# Patient Record
Sex: FEMALE | Race: WHITE | Employment: OTHER | URBAN - METROPOLITAN AREA
[De-identification: names, ages, dates, MRNs, and addresses within clinical notes are randomized per-mention and may not be internally consistent; named-entity substitution may affect disease eponyms.]

---

## 2017-02-28 ENCOUNTER — APPOINTMENT (OUTPATIENT)
Dept: GENERAL RADIOLOGY | Age: 82
End: 2017-02-28
Attending: EMERGENCY MEDICINE
Payer: MEDICARE

## 2017-02-28 ENCOUNTER — APPOINTMENT (OUTPATIENT)
Dept: CT IMAGING | Age: 82
End: 2017-02-28
Attending: EMERGENCY MEDICINE
Payer: MEDICARE

## 2017-02-28 ENCOUNTER — HOSPITAL ENCOUNTER (EMERGENCY)
Age: 82
Discharge: NURSING, REHAB OR LONG-TERM CARE | End: 2017-02-28
Attending: EMERGENCY MEDICINE
Payer: MEDICARE

## 2017-02-28 VITALS
HEART RATE: 80 BPM | WEIGHT: 230 LBS | SYSTOLIC BLOOD PRESSURE: 103 MMHG | TEMPERATURE: 98.3 F | BODY MASS INDEX: 45.16 KG/M2 | RESPIRATION RATE: 19 BRPM | HEIGHT: 60 IN | DIASTOLIC BLOOD PRESSURE: 66 MMHG | OXYGEN SATURATION: 98 %

## 2017-02-28 DIAGNOSIS — M25.571 ACUTE RIGHT ANKLE PAIN: ICD-10-CM

## 2017-02-28 DIAGNOSIS — S00.83XA FOREHEAD CONTUSION, INITIAL ENCOUNTER: ICD-10-CM

## 2017-02-28 DIAGNOSIS — S92.414A CLOSED NONDISPLACED FRACTURE OF PROXIMAL PHALANX OF RIGHT GREAT TOE, INITIAL ENCOUNTER: ICD-10-CM
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
## 2017-02-28 DIAGNOSIS — S09.90XA MINOR HEAD INJURY, INITIAL ENCOUNTER: ICD-10-CM

## 2017-02-28 PROCEDURE — 74011250637 HC RX REV CODE- 250/637: Performed by: EMERGENCY MEDICINE

## 2017-02-28 PROCEDURE — 70450 CT HEAD/BRAIN W/O DYE: CPT

## 2017-02-28 PROCEDURE — 71010 XR CHEST PORT: CPT

## 2017-02-28 PROCEDURE — 73630 X-RAY EXAM OF FOOT: CPT

## 2017-02-28 PROCEDURE — 99285 EMERGENCY DEPT VISIT HI MDM: CPT

## 2017-02-28 PROCEDURE — 72125 CT NECK SPINE W/O DYE: CPT

## 2017-02-28 PROCEDURE — 73610 X-RAY EXAM OF ANKLE: CPT

## 2017-02-28 RX ORDER — NITROFURANTOIN 25; 75 MG/1; MG/1
100 CAPSULE ORAL 2 TIMES DAILY
COMMUNITY
End: 2018-02-11

## 2017-02-28 RX ORDER — CYCLOSPORINE 0.5 MG/ML
1 EMULSION OPHTHALMIC 2 TIMES DAILY
COMMUNITY

## 2017-02-28 RX ORDER — ACETAMINOPHEN 325 MG/1
650 TABLET ORAL
Status: COMPLETED | OUTPATIENT
Start: 2017-02-28 | End: 2017-02-28

## 2017-02-28 RX ADMIN — ACETAMINOPHEN 650 MG: 325 TABLET ORAL at 21:59

## 2017-02-28 NOTE — ED PROVIDER NOTES
HPI Comments: 80 y.o. female with extensive past medical history, please see list, significant for HTN, DM, COPD, OA, sleep apnea, neuropathy, GERD, narcolepsy, and sepsis who presents to the ED via EMS for evaluation following a fall. Pt reports she was being pushed in her wheelchair this evening when her right foot got caught underneath and she \"flipped forward\" and fell face first. Denies LOC. Pt now reports swelling and hematoma to her forehead accompanied by right foot and ankle pain secondary to the fall. Pt states she also has a productive cough x 4 weeks and chest congestion. Pt states she takes aspirin but denies taking any other anticoagulants. Pt denies fever, neck pain, or back pain. There are no other acute medical complaints voiced at this time. Social Hx: Former smoker. Denies use of EtOH or drugs. PCP: Justin Waters DO    Note written by Carlos Jacobson, as dictated by Esperanza Salmon MD 6:55 PM     The history is provided by the patient and a relative.         Past Medical History:   Diagnosis Date    Anxiety     Bladder neoplasm     Cataracts, bilateral     Chronic pain     R shoulder arthritis    COPD, mild (HCC)     Coronary artery occlusion (HCC)     Dermatitis     Diabetes ([address]     DM (diabetes mellitus) (HCC)     GERD (gastroesophageal reflux disease)     Hyperlipidemia     Hypertension     Narcolepsy     Narcolepsy     Neuropathy     Osteoarthritis     Osteoarthrosis     Other screening mammogram 08/31/2015    BIRADS 1:  Negative  repeat 1 year    Pain in joint, shoulder region     Pruritic disorder     Reflux esophagitis     Sepsis ([address]     Sleep apnea        Past Surgical History:   Procedure Laterality Date    HX APPENDECTOMY      HX CYSTECTOMY  1963    breast right    HX LAP CHOLECYSTECTOMY           Family History:   Problem Relation Age of Onset    Diabetes Mother     Hypertension Mother        Social History     Social History    Marital status:      Spouse name: N/A    Number of children: N/A    Years of education: N/A     Occupational History    Not on file. Social History Main Topics    Smoking status: Former Smoker    Smokeless tobacco: Never Used    Alcohol use No    Drug use: No    Sexual activity: Not Currently     Birth control/ protection: None     Other Topics Concern    Not on file     Social History Narrative         ALLERGIES: Mercury (bulk)    Review of Systems   Constitutional: Negative for fever. HENT: Positive for congestion. Respiratory: Positive for cough. Musculoskeletal: Negative for back pain and neck pain. +right foot and ankle pain   Skin:        +swelling and hematoma to forehead   All other systems reviewed and are negative. Vitals:    02/28/17 1842   BP: 159/68   Pulse: 73   Resp: 17   Temp: 98.3 °F (36.8 °C)   SpO2: 93%   Weight: 104.3 kg (230 lb)   Height: 5' (1.524 m)            Physical Exam   Constitutional: She is oriented to person, place, and time. She appears well-developed and well-nourished. HENT:   Head: Macrocephalic. Mouth/Throat: Oropharynx is clear and moist. No oropharyngeal exudate. Eyes: Conjunctivae are normal. Pupils are equal, round, and reactive to light. Right eye exhibits no discharge. Left eye exhibits no discharge. No scleral icterus. Neck: Normal range of motion. Neck supple. Cardiovascular: Normal rate, regular rhythm and normal heart sounds. Exam reveals no gallop and no friction rub. No murmur heard. Pulmonary/Chest: Effort normal and breath sounds normal. No respiratory distress. She has no wheezes. She has no rales. Abdominal: Soft. Bowel sounds are normal. She exhibits no distension. There is no tenderness. There is no guarding. Musculoskeletal: Normal range of motion. She exhibits tenderness (right ankle maleoli and right great toe). She exhibits no edema. Lymphadenopathy:     She has no cervical adenopathy. Neurological: She is alert and oriented to person, place, and time. No cranial nerve deficit. Coordination normal.   Skin: Skin is warm and dry. No rash noted. No pallor. Nursing note and vitals reviewed. MDM  Number of Diagnoses or Management Options  Diagnosis management comments: Frontal hematoma s/p fall. Tender right foot and ankle. Cough x 1 month. DDX:  ICH, skull fx, scalp hematoma, neck injury, ankle and foot injury and others. Check cxr, right ankle and foot xr, ct head and c-spine. Ice to forehead. ED Course       Procedures    9:24 PM  JONNY TAPE GREAT TOE.  F/U ORHTOPEDICS. NON WEIGHT BEARING RIGHT FOOT. Patient's results have been reviewed with them. Patient and/or family have verbally conveyed their understanding and agreement of the patient's signs, symptoms, diagnosis, treatment and prognosis and additionally agree to follow up as recommended or return to the Emergency Room should their condition change prior to follow-up. Discharge instructions have also been provided to the patient with some educational information regarding their diagnosis as well a list of reasons why they would want to return to the ER prior to their follow-up appointment should their condition change. No results found for this or any previous visit (from the past 24 hour(s)). Xr Ankle Rt Min 3 V    Result Date: 2/28/2017  EXAM:  XR ANKLE RT MIN 3 V INDICATION:  Right ankle pain s/p fall. COMPARISON: None. FINDINGS: Three views of the right ankle demonstrate no fracture or disruption of the ankle mortise. There is diffuse osteopenia with mild DJD. [facility] The soft tissues are within normal limits. IMPRESSION: No acute abnormality. Xr Foot Rt Min 3 V    Result Date: 2/28/2017  EXAM:  XR FOOT RT MIN 3 V INDICATION:   Right foot pain post fall. COMPARISON:  None. FINDINGS:  Three views of the right foot demonstrate diffuse osteopenia.  There is a sagittally oriented intra-articular fracture base proximal phalanx of the great toe. .  The soft tissues are within normal limits. IMPRESSION:  1. Acute fracture proximal phalanx great toe. Ct Head Wo Cont    Result Date: 2/28/2017  Indication:  fall, head trauma, large hematoma Comparison: CT 11/23/2015 Findings: 5 mm axial images were obtained from the skull base through the vertex. CT dose reduction was achieved through the use of a standardized protocol tailored for this examination and automatic exposure control for dose modulation. The ventricles and cortical sulci are prominent, compatible with age related volume loss. There is no evidence of intracranial hemorrhage, mass, mass effect, or acute infarct. There is periventricular white matter disease. No extra-axial fluid collections are seen. The visualized paranasal sinuses and mastoid air cells are clear. The orbital structures are unremarkable. No osseous abnormalities are seen. Impression: 1. No evidence of acute infarct or intracranial hemorrhage. 2. Mild periventricular white matter disease is likely secondary to chronic small vessel ischemic changes. 3. Frontal scalp hematoma. Ct Spine Cerv Wo Cont    Result Date: 2/28/2017  INDICATION:   fall with head trauma and neck injury COMPARISON: None. TECHNIQUE:   Noncontrast axial CT imaging of the cervical spine was performed. Coronal and sagittal reconstructions were obtained. CT dose reduction was achieved through the use of a standardized protocol tailored for this examination and automatic exposure control for dose modulation. FINDINGS: There is no evidence of acute osseous abnormality. There is no acute alignment abnormality. There is anterolisthesis at C3-C4 measuring 5 mm. Vertebral body heights are maintained. There is no appreciable prevertebral soft tissue swelling or epidural hematoma. There is multilevel degenerative spondylosis. Evaluation of the paraspinal soft tissues demonstrates no significant pathology.  The visualized lung apices are clear. IMPRESSION: 1. No acute osseous abnormality. 2. Multilevel degenerative spondylosis. 3. Anterolisthesis at C3-C4. Xr Chest Port    Result Date: 2/28/2017  INDICATION:  cough x 1 month Exam: Portable chest 2030. Comparison September 27, 2015. Findings: Cardiomediastinal silhouette is within normal limits. Pulmonary vasculature is not engorged. There are no focal parenchymal opacities, effusions, or pneumothorax. Diffuse interstitial prominence unchanged with minimal atelectasis left chest     Impression: 1.  No acute disease

## 2017-02-28 NOTE — ED TRIAGE NOTES
Patient arrived via EMS  Patient fell out of wheelchair at 1730. Patient fell face first, presents with hematoma to forehead.

## 2017-03-01 NOTE — DISCHARGE INSTRUCTIONS
Head Injury: Care Instructions  Your Care Instructions  Most injuries to the head are minor. Bumps, cuts, and scrapes on the head and face usually heal well and can be treated the same as injuries to other parts of the body. Although it's rare, once in a while a more serious problem shows up after you are home. So it's good to be on the lookout for symptoms for a day or two. Follow-up care is a key part of your treatment and safety. Be sure to make and go to all appointments, and call your doctor if you are having problems. It's also a good idea to know your test results and keep a list of the medicines you take. How can you care for yourself at home? · Follow your doctor's instructions. He or she will tell you if you need someone to watch you closely for the next 24 hours or longer. · Take it easy for the next few days or more if you are not feeling well. · Ask your doctor when it's okay for you to go back to activities like driving a car, riding a bike, or operating machinery. When should you call for help? Call 911 anytime you think you may need emergency care. For example, call if:  · You have a seizure. · You passed out (lost consciousness). · You are confused or can't stay awake. Call your doctor now or seek immediate medical care if:  · You have new or worse vomiting. · You feel less alert. · You have new weakness or numbness in any part of your body. Watch closely for changes in your health, and be sure to contact your doctor if:  · You do not get better as expected. · You have new symptoms, such as headaches, trouble concentrating, or changes in mood. Where can you learn more? Go to http://lena-jacques.info/. Enter P589 in the search box to learn more about \"Head Injury: Care Instructions. \"  Current as of: September 7, 2016  Content Version: 11.1  © 1740-5207 Healthwise, Incorporated.  Care instructions adapted under license by [employer] (which disclaims liability or warranty for this information). If you have questions about a medical condition or this instruction, always ask your healthcare professional. [address] any warranty or liability for your use of this information. Broken Toe: Care Instructions  Your Care Instructions  You have broken (fractured) a bone in your toe. This kind of fracture does not need a special cast or brace. \"Yoandy-taping\" your broken toe to a healthy toe next to it is almost always enough to treat the problem and ease symptoms. The toe may take 4 weeks or more to heal.  You heal best when you take good care of yourself. Eat a variety of healthy foods, and don't smoke. Follow-up care is a key part of your treatment and safety. Be sure to make and go to all appointments, and call your doctor if you are having problems. It's also a good idea to know your test results and keep a list of the medicines you take. How can you care for yourself at home? · Be safe with medicines. Take pain medicines exactly as directed. ¨ If the doctor gave you a prescription medicine for pain, take it as prescribed. ¨ If you are not taking a prescription pain medicine, ask your doctor if you can take an over-the-counter medicine. · If your toe is taped to the toe next to it, your doctor has shown you how to change the tape. Protect the skin by putting something soft, such as felt or foam, between your toes before you tape them together. Never tape the toes together skin-to-skin. Your broken toe may need to be yoandy-taped for 2 to 4 weeks to heal.  · Rest and protect your toe. Do not walk on it until you can do so without too much pain. If the doctor has told you to use crutches, use them as instructed. · Put ice or a cold pack on your toe for 10 to 20 minutes at a time. Try to do this every 1 to 2 hours for the next 3 days (when you are awake) or until the swelling goes down.  Put a thin cloth between the ice and your skin.  · Prop up your foot on a pillow when you ice it or anytime you sit or lie down. Try to keep it above the level of your heart. This will help reduce swelling. · Make sure you go to your follow-up appointments. Your doctor will need to check that your toe is healing right. When should you call for help? Call your doctor now or seek immediate medical care if:  · You have severe pain. · Your toe is cool or pale or changes color. · You have tingling, weakness, or numbness in your toe. Watch closely for changes in your health, and be sure to contact your doctor if:  · Pain and swelling get worse or are not improving. · You have a new or worse deformity in your toe. Where can you learn more? Go to http://lena-jacques.info/. Enter Q560 in the search box to learn more about \"Broken Toe: Care Instructions. \"  Current as of: May 23, 2016  Content Version: 11.1  © 3531-5292 [employer]. Care instructions adapted under license by [employer] (which disclaims liability or warranty for this information). If you have questions about a medical condition or this instruction, always ask your healthcare professional. [place] [address] any warranty or liability for your use of this information. Bruises: Care Instructions  Your Care Instructions    Bruises occur when small blood vessels under the skin tear or rupture, most often from a twist, bump, or fall. Blood leaks into tissues under the skin and causes a black-and-blue spot that often turns colors, including purplish black, reddish blue, or yellowish green, as the bruise heals. Bruises hurt, but most are not serious and will go away on their own within 2 to 4 weeks. Sometimes, gravity causes them to spread down the body. A leg bruise usually will take longer to heal than a bruise on the face or arms. Follow-up care is a key part of your treatment and safety.  Be sure to make and go to all appointments, and call your doctor if you are having problems. Its also a good idea to know your test results and keep a list of the medicines you take. How can you care for yourself at home? · Take pain medicines exactly as directed. ¨ If the doctor gave you a prescription medicine for pain, take it as prescribed. ¨ If you are not taking a prescription pain medicine, ask your doctor if you can take an over-the-counter medicine. · Put ice or a cold pack on the area for 10 to 20 minutes at a time. Put a thin cloth between the ice and your skin. · If you can, prop up the bruised area on pillows as much as possible for the next few days. Try to keep the bruise above the level of your heart. When should you call for help? Call your doctor now or seek immediate medical care if:  · You have signs of infection, such as:  ¨ Increased pain, swelling, warmth, or redness. ¨ Red streaks leading from the bruise. ¨ Pus draining from the bruise. ¨ A fever. · You have a bruise on your leg and signs of a blood clot, such as:  ¨ Increasing redness and swelling along with warmth, tenderness, and pain in the bruised area. ¨ Pain in your calf, back of the knee, thigh, or groin. ¨ Redness and swelling in your leg or groin. · Your pain gets worse. Watch closely for changes in your health, and be sure to contact your doctor if:  · You do not get better as expected. Where can you learn more? Go to http://elna-jacques.info/. Enter [phone] in the search box to learn more about \"Bruises: Care Instructions. \"  Current as of: May 27, 2016  Content Version: 11.1  © 1204-6954 Healthwise, Incorporated. Care instructions adapted under license by [employer] (which disclaims liability or warranty for this information).  If you have questions about a medical condition or this instruction, always ask your healthcare professional. [address] any warranty or liability for your use of this information. We hope that we have addressed all of your medical concerns. The examination and treatment you received in the Emergency Department were for an emergent problem and were not intended as complete care. It is important that you follow up with your healthcare provider(s) for ongoing care. If your symptoms worsen or do not improve as expected, and you are unable to reach your usual health care provider(s), you should return to the Emergency Department. Today's healthcare is undergoing tremendous change, and patient satisfaction surveys are one of the many tools to assess the quality of medical care. You may receive a survey from the [employer] (MM) regarding your experience in the Emergency Department. I hope that your experience has been completely positive, particularly the medical care that I provided. As such, please participate in the survey; anything less than excellent does not meet my expectations or intentions. [facility] and 8 [facility] participate in nationally recognized quality of care measures. If your blood pressure is greater than 120/80, as reported below, we urge that you seek medical care to address the potential of high blood pressure, commonly known as hypertension. Hypertension can be hereditary or can be caused by certain medical conditions, pain, stress, or \"white coat syndrome. \"       Please make an appointment with your health care provider(s) for follow up of your Emergency Department visit.        VITALS:   Patient Vitals for the past 8 hrs:   Temp Pulse Resp BP SpO2   02/28/17 2030 - 75 16 103/66 100 %   02/28/17 2015 - 84 21 (!) 147/131 93 %   02/28/17 2000 - 73 15 (!) 156/97 98 %   02/28/17 1945 - 76 25 168/55 99 %   02/28/17 1915 - 78 17 (!) 179/106 92 %   02/28/17 1900 - 73 17 167/69 90 %   02/28/17 1845 - 75 19 163/76 97 %   02/28/17 1842 98.3 °F (36.8 °C) 73 17 159/68 93 %          Thank you for allowing us to provide you with medical care today. We realize that you have many choices for your emergency care needs. Please choose us in the future for any continued health care needs. [facility] [facility], [address]: [phone]            No results found for this or any previous visit (from the past 24 hour(s)). Xr Ankle Rt Min 3 V    Result Date: 2/28/2017  EXAM:  XR ANKLE RT MIN 3 V INDICATION:  Right ankle pain s/p fall. COMPARISON: None. FINDINGS: Three views of the right ankle demonstrate no fracture or disruption of the ankle mortise. There is diffuse osteopenia with mild DJD. [facility] The soft tissues are within normal limits. IMPRESSION: No acute abnormality. Xr Foot Rt Min 3 V    Result Date: 2/28/2017  EXAM:  XR FOOT RT MIN 3 V INDICATION:   Right foot pain post fall. COMPARISON:  None. FINDINGS:  Three views of the right foot demonstrate diffuse osteopenia. There is a sagittally oriented intra-articular fracture base proximal phalanx of the great toe. .  The soft tissues are within normal limits. IMPRESSION:  1. Acute fracture proximal phalanx great toe. Ct Head Wo Cont    Result Date: 2/28/2017  Indication:  fall, head trauma, large hematoma Comparison: CT 11/23/2015 Findings: 5 mm axial images were obtained from the skull base through the vertex. CT dose reduction was achieved through the use of a standardized protocol tailored for this examination and automatic exposure control for dose modulation. The ventricles and cortical sulci are prominent, compatible with age related volume loss. There is no evidence of intracranial hemorrhage, mass, mass effect, or acute infarct. There is periventricular white matter disease. No extra-axial fluid collections are seen. The visualized paranasal sinuses and mastoid air cells are clear. The orbital structures are unremarkable. No osseous abnormalities are seen. Impression: 1. No evidence of acute infarct or intracranial hemorrhage. 2. Mild periventricular white matter disease is likely secondary to chronic small vessel ischemic changes. 3. Frontal scalp hematoma. Ct Spine Cerv Wo Cont    Result Date: 2/28/2017  INDICATION:   fall with head trauma and neck injury COMPARISON: None. TECHNIQUE:   Noncontrast axial CT imaging of the cervical spine was performed. Coronal and sagittal reconstructions were obtained. CT dose reduction was achieved through the use of a standardized protocol tailored for this examination and automatic exposure control for dose modulation. FINDINGS: There is no evidence of acute osseous abnormality. There is no acute alignment abnormality. There is anterolisthesis at C3-C4 measuring 5 mm. Vertebral body heights are maintained. There is no appreciable prevertebral soft tissue swelling or epidural hematoma. There is multilevel degenerative spondylosis. Evaluation of the paraspinal soft tissues demonstrates no significant pathology. The visualized lung apices are clear. IMPRESSION: 1. No acute osseous abnormality. 2. Multilevel degenerative spondylosis. 3. Anterolisthesis at C3-C4. Xr Chest Port    Result Date: 2/28/2017  INDICATION:  cough x 1 month Exam: Portable chest 2030. Comparison September 27, 2015. Findings: Cardiomediastinal silhouette is within normal limits. Pulmonary vasculature is not engorged. There are no focal parenchymal opacities, effusions, or pneumothorax. Diffuse interstitial prominence unchanged with minimal atelectasis left chest     Impression: 1.  No acute disease

## 2017-03-01 NOTE — ROUTINE PROCESS
TRANSFER - OUT REPORT:    Verbal report given to Mirta Sterling RN(name) on Areli Wilson  being transferred to [facility] for routine progression of care       Report consisted of patients Situation, Background, Assessment and   Recommendations(SBAR). Information from the following report(s) SBAR, ED Summary and Recent Results was reviewed with the receiving nurse. Lines:       Opportunity for questions and clarification was provided.       Patient transported with:  EMS

## 2017-09-30 ENCOUNTER — HOSPITAL ENCOUNTER (EMERGENCY)
Age: 82
Discharge: HOME | End: 2017-10-01
Attending: STUDENT IN AN ORGANIZED HEALTH CARE EDUCATION/TRAINING PROGRAM
Payer: MEDICARE

## 2017-09-30 DIAGNOSIS — J02.9 SORE THROAT: Primary | ICD-10-CM

## 2017-09-30 LAB — DEPRECATED S PYO AG THROAT QL EIA: NEGATIVE

## 2017-09-30 PROCEDURE — 87070 CULTURE OTHR SPECIMN AEROBIC: CPT | Performed by: PHYSICIAN ASSISTANT

## 2017-09-30 PROCEDURE — 74011250637 HC RX REV CODE- 250/637: Performed by: PHYSICIAN ASSISTANT

## 2017-09-30 PROCEDURE — 99284 EMERGENCY DEPT VISIT MOD MDM: CPT

## 2017-09-30 PROCEDURE — 87880 STREP A ASSAY W/OPTIC: CPT | Performed by: PHYSICIAN ASSISTANT

## 2017-09-30 RX ORDER — ACETAMINOPHEN 325 MG/1
650 TABLET ORAL
Status: COMPLETED | OUTPATIENT
Start: 2017-09-30 | End: 2017-09-30

## 2017-09-30 RX ADMIN — ACETAMINOPHEN 650 MG: 325 TABLET ORAL at 22:00

## 2017-10-01 VITALS
BODY MASS INDEX: 47.12 KG/M2 | TEMPERATURE: 99.6 F | HEIGHT: 60 IN | WEIGHT: 240 LBS | RESPIRATION RATE: 16 BRPM | OXYGEN SATURATION: 95 % | SYSTOLIC BLOOD PRESSURE: 110 MMHG | DIASTOLIC BLOOD PRESSURE: 85 MMHG | HEART RATE: 93 BPM

## 2017-10-01 PROCEDURE — 74011000250 HC RX REV CODE- 250: Performed by: PHYSICIAN ASSISTANT

## 2017-10-01 RX ADMIN — BENZOCAINE: 200 SPRAY DENTAL; ORAL; PERIODONTAL at 00:00

## 2017-10-01 NOTE — ED NOTES
Discharge instructions given by provider. Patient verbalized an understanding, has no further questions.      [facility] called with patients ETA

## 2017-10-01 NOTE — ED PROVIDER NOTES
HPI Comments: Kear Andrade is a 80 y.o. female  who presents by EMS to ER with c/o Patient presents with:  Sore Throat. Patient came from [facility]. Per nursing home notes patient with sore throat since this morning and temp of 100. Patients symptoms were not resolved with guaifenesin so patient was brought to ED. Patient denies any other complaints. She specifically denies any  chills, nausea, vomiting, chest pain, shortness of breath, headache, rash, diarrhea, abdominal pain, urinary/bowel changes, sweating or weight loss. PCP: Anjali Hansen DO   PMHx significant for: Past Medical History:  No date: Anxiety  No date: Bladder neoplasm  No date: Cataracts, bilateral  No date: Chronic pain      Comment: R shoulder arthritis  No date: COPD, mild (HCC)  No date: Coronary artery occlusion (HCC)  No date: Dermatitis  No date: Diabetes (HCC)  No date: DM (diabetes mellitus) ([facility])  No date: GERD (gastroesophageal reflux disease)  No date: Hyperlipidemia  No date: Hypertension  No date: Narcolepsy  No date: Narcolepsy  No date: Neuropathy ([facility]  No date: Osteoarthritis  No date: Osteoarthrosis  08/31/2015: Other screening mammogram      Comment: BIRADS 1:  Negative  repeat 1 year  No date: Pain in joint, shoulder region  No date: Pruritic disorder  No date: Reflux esophagitis  No date: Sepsis ([facility] [address]  No date: Sleep apnea   PSHx significant for: Past Surgical History:  No date: HX APPENDECTOMY  1963: HX CYSTECTOMY      Comment: breast right  No date: HX LAP CHOLECYSTECTOMY  Social Hx: Tobacco use: Smoking status: Former Smoker                                                              Packs/day: 0.00      Years: 0.00      Smokeless status: Never Used                      ; EtOH use: The patient states she drinks 0 per week.; Illicit Drug use: Allergies:   -- Mercury (Bulk) -- Unknown (comments)    There are no other complaints, changes or physical findings at this time. Patient is a 80 y.o. female presenting with sore throat. The history is provided by the patient. Sore Throat    This is a new problem. The current episode started 12 to 24 hours ago. The problem has not changed since onset. There has been no fever. Pertinent negatives include no diarrhea, no vomiting, no congestion, no drooling, no ear discharge, no ear pain, no headaches, no plugged ear sensation, no shortness of breath, no stridor, no swollen glands, no trouble swallowing, no stiff neck and no cough. She has had no exposure to strep or mono. She has tried nothing for the symptoms. Past Medical History:   Diagnosis Date    Anxiety     Bladder neoplasm     Cataracts, bilateral     Chronic pain     R shoulder arthritis    COPD, mild (HCC)     Coronary artery occlusion (HCC)     Dermatitis     Diabetes ([address]     DM (diabetes mellitus) (HCC)     GERD (gastroesophageal reflux disease)     Hyperlipidemia     Hypertension     Narcolepsy     Narcolepsy     Neuropathy (HCC)     Osteoarthritis     Osteoarthrosis     Other screening mammogram 08/31/2015    BIRADS 1:  Negative  repeat 1 year    Pain in joint, shoulder region     Pruritic disorder     Reflux esophagitis     Sepsis ([address]     Sleep apnea        Past Surgical History:   Procedure Laterality Date    HX APPENDECTOMY      HX CYSTECTOMY  1963    breast right    HX LAP CHOLECYSTECTOMY           Family History:   Problem Relation Age of Onset    Diabetes Mother     Hypertension Mother        Social History     Social History    Marital status:      Spouse name: N/A    Number of children: N/A    Years of education: N/A     Occupational History    Not on file.      Social History Main Topics    Smoking status: Former Smoker    Smokeless tobacco: Never Used    Alcohol use No    Drug use: No    Sexual activity: Not Currently     Birth control/ protection: None     Other Topics Concern    Not on file     Social History Narrative ALLERGIES: Mercury (bulk)    Review of Systems   Constitutional: Negative. HENT: Positive for sore throat. Negative for congestion, drooling, ear discharge, ear pain and trouble swallowing. Eyes: Negative. Respiratory: Negative. Negative for cough, shortness of breath and stridor. Cardiovascular: Negative. Gastrointestinal: Negative. Negative for diarrhea and vomiting. Endocrine: Negative. Genitourinary: Negative. Musculoskeletal: Negative. Skin: Negative. Allergic/Immunologic: Negative. Neurological: Negative. Negative for headaches. Hematological: Negative. Psychiatric/Behavioral: Negative. All other systems reviewed and are negative. Vitals:    09/30/17 2129   BP: 105/86   Pulse: 93   Resp: 16   Temp: 99.6 °F (37.6 °C)   SpO2: 94%   Weight: 108.9 kg (240 lb)   Height: 5' (1.524 m)            Physical Exam   Constitutional: She is oriented to person, place, and time. Vital signs are normal. She appears well-developed and well-nourished. Non-toxic appearance. She does not have a sickly appearance. She does not appear ill. No distress. HENT:   Head: Normocephalic and atraumatic. Right Ear: Tympanic membrane, external ear and ear canal normal.   Left Ear: Tympanic membrane, external ear and ear canal normal.   Mouth/Throat: Uvula is midline. Mucous membranes are not pale and dry. Posterior oropharyngeal erythema present. No oropharyngeal exudate or posterior oropharyngeal edema. Eyes: Conjunctivae and EOM are normal. Pupils are equal, round, and reactive to light. Right eye exhibits no discharge. Left eye exhibits no discharge. No scleral icterus. Neck: Normal range of motion. No tracheal deviation present. No thyromegaly present. Cardiovascular: Normal rate, regular rhythm and normal heart sounds. No murmur heard. Pulmonary/Chest: Effort normal and breath sounds normal. No accessory muscle usage. No respiratory distress.  She has no decreased breath sounds. She has no wheezes. She has no rales. She exhibits no tenderness. Abdominal: Soft. Normal appearance and bowel sounds are normal. She exhibits no distension. There is no tenderness. There is no rebound and no guarding. Musculoskeletal: Normal range of motion. She exhibits no edema or tenderness. Lymphadenopathy:     She has no cervical adenopathy. Neurological: She is alert and oriented to person, place, and time. No cranial nerve deficit. Coordination normal.   Skin: Skin is warm. No erythema. Psychiatric: She has a normal mood and affect. Her behavior is normal. Judgment and thought content normal.   Nursing note and vitals reviewed. MDM  Number of Diagnoses or Management Options  Sore throat:   Diagnosis management comments: Assesment/Plan- 80 y.o. Patient presents with:  Sore Throat  differential includes: strep, pharyngitis, uri. Labs reviewed with negative poc strep test. Will discharge with phenol spray. Recommend PCP follow up. Patient educated on reasons to return to the ED.          Amount and/or Complexity of Data Reviewed  Clinical lab tests: ordered and reviewed      ED Course       Procedures

## 2017-10-01 NOTE — DISCHARGE INSTRUCTIONS
Sore Throat: Care Instructions  Your Care Instructions    Infection by bacteria or a virus causes most sore throats. Cigarette smoke, dry air, air pollution, allergies, and yelling can also cause a sore throat. Sore throats can be painful and annoying. Fortunately, most sore throats go away on their own. If you have a bacterial infection, your doctor may prescribe antibiotics. Follow-up care is a key part of your treatment and safety. Be sure to make and go to all appointments, and call your doctor if you are having problems. It's also a good idea to know your test results and keep a list of the medicines you take. How can you care for yourself at home? · If your doctor prescribed antibiotics, take them as directed. Do not stop taking them just because you feel better. You need to take the full course of antibiotics. · Gargle with warm salt water once an hour to help reduce swelling and relieve discomfort. Use 1 teaspoon of salt mixed in 1 cup of warm water. · Take an over-the-counter pain medicine, such as acetaminophen (Tylenol), ibuprofen (Advil, Motrin), or naproxen (Aleve). Read and follow all instructions on the label. · Be careful when taking over-the-counter cold or flu medicines and Tylenol at the same time. Many of these medicines have acetaminophen, which is Tylenol. Read the labels to make sure that you are not taking more than the recommended dose. Too much acetaminophen (Tylenol) can be harmful. · Drink plenty of fluids. Fluids may help soothe an irritated throat. Hot fluids, such as tea or soup, may help decrease throat pain. · Use over-the-counter throat lozenges to soothe pain. Regular cough drops or hard candy may also help. These should not be given to young children because of the risk of choking. · Do not smoke or allow others to smoke around you. If you need help quitting, talk to your doctor about stop-smoking programs and medicines.  These can increase your chances of quitting for good. · Use a vaporizer or humidifier to add moisture to your bedroom. Follow the directions for cleaning the machine. When should you call for help? Call your doctor now or seek immediate medical care if:  · You have new or worse trouble swallowing. · Your sore throat gets much worse on one side. Watch closely for changes in your health, and be sure to contact your doctor if you do not get better as expected. Where can you learn more? Go to http://lena-jacques.info/. Enter [phone] in the search box to learn more about \"Sore Throat: Care Instructions. \"  Current as of: July 29, 2016  Content Version: 11.3  © 6924-3277 [employer]. Care instructions adapted under license by [employer] (which disclaims liability or warranty for this information). If you have questions about a medical condition or this instruction, always ask your healthcare professional. [address] any warranty or liability for your use of this information.

## 2017-10-03 LAB
BACTERIA SPEC CULT: NORMAL
SERVICE CMNT-IMP: NORMAL

## 2018-02-11 ENCOUNTER — APPOINTMENT (OUTPATIENT)
Dept: CT IMAGING | Age: 83
DRG: 690 | End: 2018-02-11
Attending: UROLOGY
Payer: MEDICARE

## 2018-02-11 ENCOUNTER — HOSPITAL ENCOUNTER (INPATIENT)
Age: 83
LOS: 2 days | Discharge: NURSING, REHAB OR LONG-TERM CARE | DRG: 690 | End: 2018-02-13
Attending: EMERGENCY MEDICINE | Admitting: INTERNAL MEDICINE
Payer: MEDICARE

## 2018-02-11 ENCOUNTER — APPOINTMENT (OUTPATIENT)
Dept: GENERAL RADIOLOGY | Age: 83
DRG: 690 | End: 2018-02-11
Attending: EMERGENCY MEDICINE
Payer: MEDICARE

## 2018-02-11 DIAGNOSIS — N39.0 URINARY TRACT INFECTION WITH HEMATURIA, SITE UNSPECIFIED: ICD-10-CM

## 2018-02-11 DIAGNOSIS — R31.9 HEMATURIA, UNSPECIFIED TYPE: Primary | ICD-10-CM

## 2018-02-11 DIAGNOSIS — R00.0 TACHYCARDIA: ICD-10-CM

## 2018-02-11 DIAGNOSIS — R31.9 URINARY TRACT INFECTION WITH HEMATURIA, SITE UNSPECIFIED: ICD-10-CM

## 2018-02-11 LAB
ABO + RH BLD: NORMAL
ALBUMIN SERPL-MCNC: 2.6 G/DL (ref 3.5–5)
ALBUMIN/GLOB SERPL: 0.5 {RATIO} (ref 1.1–2.2)
ALP SERPL-CCNC: 148 U/L (ref 45–117)
ALT SERPL-CCNC: 30 U/L (ref 12–78)
ANION GAP SERPL CALC-SCNC: 7 MMOL/L (ref 5–15)
APPEARANCE UR: ABNORMAL
AST SERPL-CCNC: 51 U/L (ref 15–37)
BACTERIA URNS QL MICRO: ABNORMAL /HPF
BASOPHILS # BLD: 0.1 K/UL (ref 0–0.1)
BASOPHILS NFR BLD: 1 % (ref 0–1)
BILIRUB SERPL-MCNC: 0.6 MG/DL (ref 0.2–1)
BILIRUB UR QL: NEGATIVE
BLOOD GROUP ANTIBODIES SERPL: NORMAL
BUN SERPL-MCNC: 13 MG/DL (ref 6–20)
BUN/CREAT SERPL: 13 (ref 12–20)
CALCIUM SERPL-MCNC: 8.9 MG/DL (ref 8.5–10.1)
CHLORIDE SERPL-SCNC: 104 MMOL/L (ref 97–108)
CO2 SERPL-SCNC: 27 MMOL/L (ref 21–32)
COLOR UR: ABNORMAL
CREAT SERPL-MCNC: 1 MG/DL (ref 0.55–1.02)
DIFFERENTIAL METHOD BLD: NORMAL
EOSINOPHIL # BLD: 0.4 K/UL (ref 0–0.4)
EOSINOPHIL NFR BLD: 5 % (ref 0–7)
EPITH CASTS URNS QL MICRO: ABNORMAL /LPF
ERYTHROCYTE [DISTWIDTH] IN BLOOD BY AUTOMATED COUNT: 14.4 % (ref 11.5–14.5)
GLOBULIN SER CALC-MCNC: 5.2 G/DL (ref 2–4)
GLUCOSE BLD STRIP.AUTO-MCNC: 135 MG/DL (ref 65–100)
GLUCOSE BLD STRIP.AUTO-MCNC: 147 MG/DL (ref 65–100)
GLUCOSE BLD STRIP.AUTO-MCNC: 215 MG/DL (ref 65–100)
GLUCOSE SERPL-MCNC: 172 MG/DL (ref 65–100)
GLUCOSE UR STRIP.AUTO-MCNC: NEGATIVE MG/DL
HCT VFR BLD AUTO: 39.7 % (ref 35–47)
HEMOCCULT STL QL: NEGATIVE
HGB BLD-MCNC: 12.9 G/DL (ref 11.5–16)
HGB UR QL STRIP: ABNORMAL
IMM GRANULOCYTES # BLD: 0 K/UL (ref 0–0.04)
IMM GRANULOCYTES NFR BLD AUTO: 0 % (ref 0–0.5)
KETONES UR QL STRIP.AUTO: NEGATIVE MG/DL
LACTATE SERPL-SCNC: 1.7 MMOL/L (ref 0.4–2)
LEUKOCYTE ESTERASE UR QL STRIP.AUTO: ABNORMAL
LYMPHOCYTES # BLD: 2 K/UL (ref 0.8–3.5)
LYMPHOCYTES NFR BLD: 26 % (ref 12–49)
MAGNESIUM SERPL-MCNC: 1.7 MG/DL (ref 1.6–2.4)
MCH RBC QN AUTO: 30.5 PG (ref 26–34)
MCHC RBC AUTO-ENTMCNC: 32.5 G/DL (ref 30–36.5)
MCV RBC AUTO: 93.9 FL (ref 80–99)
MONOCYTES # BLD: 0.8 K/UL (ref 0–1)
MONOCYTES NFR BLD: 10 % (ref 5–13)
NEUTS SEG # BLD: 4.5 K/UL (ref 1.8–8)
NEUTS SEG NFR BLD: 58 % (ref 32–75)
NITRITE UR QL STRIP.AUTO: NEGATIVE
NRBC # BLD: 0 K/UL (ref 0–0.01)
NRBC BLD-RTO: 0 PER 100 WBC
PH UR STRIP: 6 [PH] (ref 5–8)
PLATELET # BLD AUTO: 183 K/UL (ref 150–400)
PMV BLD AUTO: 9.9 FL (ref 8.9–12.9)
POTASSIUM SERPL-SCNC: 4.2 MMOL/L (ref 3.5–5.1)
PROT SERPL-MCNC: 7.8 G/DL (ref 6.4–8.2)
PROT UR STRIP-MCNC: 100 MG/DL
RBC # BLD AUTO: 4.23 M/UL (ref 3.8–5.2)
RBC #/AREA URNS HPF: >100 /HPF (ref 0–5)
SERVICE CMNT-IMP: ABNORMAL
SODIUM SERPL-SCNC: 138 MMOL/L (ref 136–145)
SP GR UR REFRACTOMETRY: 1.01 (ref 1–1.03)
SPECIMEN EXP DATE BLD: NORMAL
TROPONIN I SERPL-MCNC: <0.04 NG/ML
UA: UC IF INDICATED,UAUC: ABNORMAL
UROBILINOGEN UR QL STRIP.AUTO: 0.2 EU/DL (ref 0.2–1)
WBC # BLD AUTO: 7.8 K/UL (ref 3.6–11)
WBC URNS QL MICRO: >100 /HPF (ref 0–4)

## 2018-02-11 PROCEDURE — 74011000258 HC RX REV CODE- 258: Performed by: EMERGENCY MEDICINE

## 2018-02-11 PROCEDURE — 82962 GLUCOSE BLOOD TEST: CPT

## 2018-02-11 PROCEDURE — 99285 EMERGENCY DEPT VISIT HI MDM: CPT

## 2018-02-11 PROCEDURE — 71045 X-RAY EXAM CHEST 1 VIEW: CPT

## 2018-02-11 PROCEDURE — 86900 BLOOD TYPING SEROLOGIC ABO: CPT | Performed by: EMERGENCY MEDICINE

## 2018-02-11 PROCEDURE — 83735 ASSAY OF MAGNESIUM: CPT | Performed by: INTERNAL MEDICINE

## 2018-02-11 PROCEDURE — 87086 URINE CULTURE/COLONY COUNT: CPT | Performed by: EMERGENCY MEDICINE

## 2018-02-11 PROCEDURE — 74011000250 HC RX REV CODE- 250: Performed by: INTERNAL MEDICINE

## 2018-02-11 PROCEDURE — 93005 ELECTROCARDIOGRAM TRACING: CPT

## 2018-02-11 PROCEDURE — 84484 ASSAY OF TROPONIN QUANT: CPT | Performed by: EMERGENCY MEDICINE

## 2018-02-11 PROCEDURE — 87077 CULTURE AEROBIC IDENTIFY: CPT | Performed by: EMERGENCY MEDICINE

## 2018-02-11 PROCEDURE — 65660000000 HC RM CCU STEPDOWN

## 2018-02-11 PROCEDURE — 82272 OCCULT BLD FECES 1-3 TESTS: CPT | Performed by: EMERGENCY MEDICINE

## 2018-02-11 PROCEDURE — 74011250637 HC RX REV CODE- 250/637: Performed by: INTERNAL MEDICINE

## 2018-02-11 PROCEDURE — 74011250636 HC RX REV CODE- 250/636: Performed by: EMERGENCY MEDICINE

## 2018-02-11 PROCEDURE — 36415 COLL VENOUS BLD VENIPUNCTURE: CPT | Performed by: EMERGENCY MEDICINE

## 2018-02-11 PROCEDURE — 87186 SC STD MICRODIL/AGAR DIL: CPT | Performed by: EMERGENCY MEDICINE

## 2018-02-11 PROCEDURE — 74011636637 HC RX REV CODE- 636/637: Performed by: INTERNAL MEDICINE

## 2018-02-11 PROCEDURE — 83605 ASSAY OF LACTIC ACID: CPT | Performed by: INTERNAL MEDICINE

## 2018-02-11 PROCEDURE — 74011250636 HC RX REV CODE- 250/636: Performed by: INTERNAL MEDICINE

## 2018-02-11 PROCEDURE — 51798 US URINE CAPACITY MEASURE: CPT

## 2018-02-11 PROCEDURE — 85025 COMPLETE CBC W/AUTO DIFF WBC: CPT | Performed by: EMERGENCY MEDICINE

## 2018-02-11 PROCEDURE — 74176 CT ABD & PELVIS W/O CONTRAST: CPT

## 2018-02-11 PROCEDURE — 80053 COMPREHEN METABOLIC PANEL: CPT | Performed by: EMERGENCY MEDICINE

## 2018-02-11 PROCEDURE — 81001 URINALYSIS AUTO W/SCOPE: CPT | Performed by: EMERGENCY MEDICINE

## 2018-02-11 RX ORDER — TRIAMCINOLONE ACETONIDE 1 MG/G
CREAM TOPICAL 2 TIMES DAILY
COMMUNITY

## 2018-02-11 RX ORDER — CALCIUM CARBONATE 200(500)MG
200 TABLET,CHEWABLE ORAL 2 TIMES DAILY
Status: DISCONTINUED | OUTPATIENT
Start: 2018-02-11 | End: 2018-02-13 | Stop reason: HOSPADM

## 2018-02-11 RX ORDER — LORATADINE 10 MG/1
10 TABLET ORAL DAILY
COMMUNITY

## 2018-02-11 RX ORDER — SODIUM CHLORIDE 0.9 % (FLUSH) 0.9 %
5-10 SYRINGE (ML) INJECTION AS NEEDED
Status: DISCONTINUED | OUTPATIENT
Start: 2018-02-11 | End: 2018-02-13 | Stop reason: HOSPADM

## 2018-02-11 RX ORDER — MICONAZOLE NITRATE 2 %
POWDER (GRAM) TOPICAL 2 TIMES DAILY
COMMUNITY

## 2018-02-11 RX ORDER — FACIAL-BODY WIPES
10 EACH TOPICAL DAILY
COMMUNITY
End: 2018-02-11

## 2018-02-11 RX ORDER — INSULIN ASPART 100 [IU]/ML
INJECTION, SOLUTION INTRAVENOUS; SUBCUTANEOUS
COMMUNITY

## 2018-02-11 RX ORDER — METOPROLOL SUCCINATE 25 MG/1
12.5 TABLET, EXTENDED RELEASE ORAL DAILY
Status: DISCONTINUED | OUTPATIENT
Start: 2018-02-11 | End: 2018-02-12

## 2018-02-11 RX ORDER — FAMOTIDINE 20 MG/1
20 TABLET, FILM COATED ORAL
COMMUNITY

## 2018-02-11 RX ORDER — INSULIN GLARGINE 100 [IU]/ML
15 INJECTION, SOLUTION SUBCUTANEOUS DAILY
Status: DISCONTINUED | OUTPATIENT
Start: 2018-02-12 | End: 2018-02-13 | Stop reason: HOSPADM

## 2018-02-11 RX ORDER — SODIUM CHLORIDE 0.9 % (FLUSH) 0.9 %
5-10 SYRINGE (ML) INJECTION EVERY 8 HOURS
Status: DISCONTINUED | OUTPATIENT
Start: 2018-02-11 | End: 2018-02-13 | Stop reason: HOSPADM

## 2018-02-11 RX ORDER — DICLOFENAC SODIUM 10 MG/G
2 GEL TOPICAL 3 TIMES DAILY
Status: DISCONTINUED | OUTPATIENT
Start: 2018-02-11 | End: 2018-02-13 | Stop reason: HOSPADM

## 2018-02-11 RX ORDER — SODIUM CHLORIDE 9 MG/ML
75 INJECTION, SOLUTION INTRAVENOUS CONTINUOUS
Status: DISCONTINUED | OUTPATIENT
Start: 2018-02-11 | End: 2018-02-13 | Stop reason: HOSPADM

## 2018-02-11 RX ORDER — OSELTAMIVIR PHOSPHATE 30 MG/1
30 CAPSULE ORAL DAILY
COMMUNITY
Start: 2018-02-09 | End: 2018-02-13

## 2018-02-11 RX ORDER — MAGNESIUM SULFATE 100 %
4 CRYSTALS MISCELLANEOUS AS NEEDED
Status: DISCONTINUED | OUTPATIENT
Start: 2018-02-11 | End: 2018-02-13 | Stop reason: HOSPADM

## 2018-02-11 RX ORDER — FAMOTIDINE 20 MG/1
20 TABLET, FILM COATED ORAL
Status: DISCONTINUED | OUTPATIENT
Start: 2018-02-11 | End: 2018-02-13 | Stop reason: HOSPADM

## 2018-02-11 RX ORDER — DEXTROSE 50 % IN WATER (D50W) INTRAVENOUS SYRINGE
12.5-25 AS NEEDED
Status: DISCONTINUED | OUTPATIENT
Start: 2018-02-11 | End: 2018-02-13 | Stop reason: HOSPADM

## 2018-02-11 RX ORDER — ALENDRONATE SODIUM 70 MG/1
70 TABLET ORAL
COMMUNITY

## 2018-02-11 RX ORDER — CYCLOSPORINE 0.5 MG/ML
1 EMULSION OPHTHALMIC 2 TIMES DAILY
Status: DISCONTINUED | OUTPATIENT
Start: 2018-02-11 | End: 2018-02-13 | Stop reason: HOSPADM

## 2018-02-11 RX ORDER — GLUCOSAMINE SULFATE 1500 MG
1000 POWDER IN PACKET (EA) ORAL DAILY
COMMUNITY

## 2018-02-11 RX ORDER — NYSTATIN 100000 U/G
CREAM TOPICAL 2 TIMES DAILY
COMMUNITY
End: 2018-02-13

## 2018-02-11 RX ORDER — POLYETHYLENE GLYCOL 3350 17 G/17G
17 POWDER, FOR SOLUTION ORAL
Status: DISCONTINUED | OUTPATIENT
Start: 2018-02-11 | End: 2018-02-13 | Stop reason: HOSPADM

## 2018-02-11 RX ORDER — INSULIN ASPART 100 [IU]/ML
4 INJECTION, SOLUTION INTRAVENOUS; SUBCUTANEOUS
COMMUNITY

## 2018-02-11 RX ORDER — KETOCONAZOLE 20 MG/ML
SHAMPOO TOPICAL
COMMUNITY

## 2018-02-11 RX ORDER — NALOXONE HYDROCHLORIDE 0.4 MG/ML
0.4 INJECTION, SOLUTION INTRAMUSCULAR; INTRAVENOUS; SUBCUTANEOUS AS NEEDED
Status: DISCONTINUED | OUTPATIENT
Start: 2018-02-11 | End: 2018-02-13 | Stop reason: HOSPADM

## 2018-02-11 RX ORDER — DICLOFENAC SODIUM 10 MG/G
2 GEL TOPICAL 3 TIMES DAILY
COMMUNITY

## 2018-02-11 RX ORDER — ASCORBIC ACID 500 MG
500 TABLET ORAL 2 TIMES DAILY
Status: DISCONTINUED | OUTPATIENT
Start: 2018-02-11 | End: 2018-02-13 | Stop reason: HOSPADM

## 2018-02-11 RX ORDER — HYDROCODONE BITARTRATE AND ACETAMINOPHEN 5; 325 MG/1; MG/1
1 TABLET ORAL
COMMUNITY

## 2018-02-11 RX ORDER — LOPERAMIDE HCL 2 MG
2 TABLET ORAL
COMMUNITY
End: 2018-02-11

## 2018-02-11 RX ORDER — MELATONIN
1000 DAILY
Status: DISCONTINUED | OUTPATIENT
Start: 2018-02-12 | End: 2018-02-13 | Stop reason: HOSPADM

## 2018-02-11 RX ORDER — OXYBUTYNIN CHLORIDE 10 MG/1
10 TABLET, EXTENDED RELEASE ORAL DAILY
COMMUNITY
End: 2018-02-13

## 2018-02-11 RX ORDER — THERA TABS 400 MCG
1 TAB ORAL DAILY
Status: DISCONTINUED | OUTPATIENT
Start: 2018-02-12 | End: 2018-02-13 | Stop reason: HOSPADM

## 2018-02-11 RX ORDER — FLUCONAZOLE 150 MG/1
150 TABLET ORAL DAILY
COMMUNITY
End: 2018-02-11

## 2018-02-11 RX ORDER — INSULIN LISPRO 100 [IU]/ML
INJECTION, SOLUTION INTRAVENOUS; SUBCUTANEOUS
Status: DISCONTINUED | OUTPATIENT
Start: 2018-02-11 | End: 2018-02-13 | Stop reason: HOSPADM

## 2018-02-11 RX ORDER — HYDROCODONE BITARTRATE AND ACETAMINOPHEN 5; 325 MG/1; MG/1
1 TABLET ORAL
Status: DISCONTINUED | OUTPATIENT
Start: 2018-02-11 | End: 2018-02-13 | Stop reason: HOSPADM

## 2018-02-11 RX ORDER — LORATADINE 10 MG/1
10 TABLET ORAL DAILY
Status: DISCONTINUED | OUTPATIENT
Start: 2018-02-12 | End: 2018-02-13 | Stop reason: HOSPADM

## 2018-02-11 RX ORDER — NYSTATIN 100000 [USP'U]/G
POWDER TOPICAL 3 TIMES DAILY
Status: DISCONTINUED | OUTPATIENT
Start: 2018-02-11 | End: 2018-02-13 | Stop reason: HOSPADM

## 2018-02-11 RX ADMIN — SODIUM CHLORIDE 100 ML/HR: 900 INJECTION, SOLUTION INTRAVENOUS at 10:16

## 2018-02-11 RX ADMIN — SODIUM CHLORIDE 1 G: 900 INJECTION, SOLUTION INTRAVENOUS at 10:16

## 2018-02-11 RX ADMIN — DICLOFENAC 2 G: 10 GEL TOPICAL at 21:32

## 2018-02-11 RX ADMIN — NYSTATIN: 100000 POWDER TOPICAL at 17:06

## 2018-02-11 RX ADMIN — SODIUM CHLORIDE 1000 ML: 900 INJECTION, SOLUTION INTRAVENOUS at 08:48

## 2018-02-11 RX ADMIN — INSULIN LISPRO 2 UNITS: 100 INJECTION, SOLUTION INTRAVENOUS; SUBCUTANEOUS at 21:31

## 2018-02-11 RX ADMIN — INSULIN LISPRO 2 UNITS: 100 INJECTION, SOLUTION INTRAVENOUS; SUBCUTANEOUS at 11:48

## 2018-02-11 RX ADMIN — FAMOTIDINE 20 MG: 20 TABLET, FILM COATED ORAL at 21:31

## 2018-02-11 RX ADMIN — Medication 10 ML: at 13:27

## 2018-02-11 RX ADMIN — NYSTATIN: 100000 POWDER TOPICAL at 21:33

## 2018-02-11 RX ADMIN — CYCLOSPORINE 1 DROP: 0.5 EMULSION OPHTHALMIC at 21:32

## 2018-02-11 RX ADMIN — Medication 10 ML: at 21:33

## 2018-02-11 RX ADMIN — DICLOFENAC 2 G: 10 GEL TOPICAL at 21:36

## 2018-02-11 RX ADMIN — MINERAL OIL, PETROLATUM: 425; 573 OINTMENT OPHTHALMIC at 21:32

## 2018-02-11 RX ADMIN — ANTACID TABLETS 200 MG: 500 TABLET, CHEWABLE ORAL at 17:57

## 2018-02-11 RX ADMIN — SODIUM CHLORIDE 75 ML/HR: 900 INJECTION, SOLUTION INTRAVENOUS at 19:24

## 2018-02-11 RX ADMIN — METOPROLOL SUCCINATE 12.5 MG: 25 TABLET, EXTENDED RELEASE ORAL at 17:57

## 2018-02-11 RX ADMIN — OXYCODONE HYDROCHLORIDE AND ACETAMINOPHEN 500 MG: 500 TABLET ORAL at 17:57

## 2018-02-11 NOTE — ED TRIAGE NOTES
Pt arrives via ems from [facility] with c/o bloody stool this morning x1. Pt with no complaints.

## 2018-02-11 NOTE — IP AVS SNAPSHOT
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 
[phone]  
 follow up with primary care physician In 1 week Ke Ibarra MD In 3 days for bloody urine, with history of bladder cancer [address] [facility] [address] [address] 
[phone] Jude Walton MD   Patient can only remember the practice name and not the physician Discharge Orders None A check lena indicates which time of day the medication should be taken. My Medications START taking these medications Instructions Each Dose to Equal  
 Morning Noon Evening Bedtime  
 cefdinir 300 mg capsule Commonly known as:  OMNICEF Your last dose was: To begin after discharge. Take 1 Cap by mouth two (2) times a day for 7 days. 300 mg  
    
   
   
   
  
 nystatin powder Commonly known as:  MYCOSTATIN Replaces:  nystatin topical cream  
Your last dose was: Last dose given on 2/13 at 9:15 a.m. Apply  to affected area three (3) times daily. CONTINUE taking these medications Instructions Each Dose to Equal  
 Morning Noon Evening Bedtime  
 ascorbic acid (vitamin C) 500 mg tablet Commonly known as:  VITAMIN C Your last dose was: Last dose given on 2/13 at 9:15 a.m. Take 1 Tab by mouth two (2) times a day. 500 mg  
    
   
   
   
  
 calcium carbonate 200 mg calcium (500 mg) Chew Commonly known as:  TUMS Your last dose was: Last dose given on 2/13 at 9:15 a.m. Take 1 Tab by mouth two (2) times a day. 1 Tab  
    
   
   
   
  
 famotidine 20 mg tablet Commonly known as:  PEPCID Your last dose was: Last dose given 2/12 at 9:30 p.m. Take 20 mg by mouth nightly. 20 mg  
    
   
   
   
  
 FOSAMAX 70 mg tablet Generic drug:  alendronate Your last dose was:  Not given in the hospital.  
   
 Take 70 mg by mouth every Wednesday. 70 mg  
    
   
   
   
  
 ketoconazole 2 % shampoo Commonly known as:  NIZORAL Your last dose was:  Not given in the hospital.  
   
 Apply  to affected area two (2) days a week. On Monday and Thursday LEVEMIR 100 unit/mL injection Generic drug:  insulin detemir Your last dose was:  Not given in the hospital.  
   
 20 Units by SubCUTAneous route daily. 20 Units  
    
   
   
   
  
 loratadine 10 mg tablet Commonly known as:  Michaeline Haven Your last dose was: Last dose given on 2/13 at 9:15 a.m. Take 10 mg by mouth daily. 10 mg MIRALAX 17 gram/dose powder Generic drug:  polyethylene glycol Your last dose was:  Not given in the hospital.  
   
 Take 17 g by mouth nightly. Mix in 8 oz of water or juice. Hold if loose stools develop 17 g NORCO 5-325 mg per tablet Generic drug:  HYDROcodone-acetaminophen Your last dose was:  Not given in the hospital.  
   
 Take 1 Tab by mouth two (2) times daily as needed for Pain. 1 Tab * NovoLOG 100 unit/mL injection Generic drug:  insulin aspart Your last dose was:  Given per sliding scale instructions. by SubCUTAneous route Before breakfast, lunch, and dinner. Sliding scale 160 to 199 give 2 units 200 to 249 give 3 units 250 to 299 give 5 units 300 to 349 give 7 units 350 to 399 give 9 units Greater than 400 call MD  
     
   
   
   
  
 * NovoLOG 100 unit/mL injection Generic drug:  insulin aspart Your last dose was:  Given per sliding scale instructions. by SubCUTAneous route nightly. Sliding scale 200 to 249 give 2 units 250 to 299 give 3 units 300 to 349 give 4 units 350 to 399 give 5 units Greater than 400 call MD  
     
   
   
   
  
 * NovoLOG 100 unit/mL injection Generic drug:  insulin aspart  
   
 4 Units by SubCUTAneous route Before breakfast, lunch, and dinner. 4 Units OTHER Your last dose was:  Not given in the hospital.  
   
 Cranberry 450 mg twice daily  
     
   
   
   
  
 phenol 0.5 % Spra Commonly known as:  Estefania Sun Your last dose was:  Not given in the hospital.  
   
 1 Spray by Mucous Membrane route four (4) times daily as needed. 1 [place] REFRESH P.M. 57.3-42.5 % Oint ophthalmic ointment Generic drug:  White Petrolatum-Mineral Oil Your last dose was: Last dose given 2/12 at 9:30 p.m. Administer  to both eyes nightly. RESTASIS 0.05 % ophthalmic emulsion Generic drug:  cycloSPORINE Your last dose was: Last dose given on 2/13 at 9:15 a.m. Administer 1 Drop to both eyes two (2) times a day. 1 Drop  
    
   
   
   
  
 therapeutic multivitamin tablet Commonly known as:  [facility] Your last dose was: Last dose given on 2/13 at 9:15 a.m. Take 1 Tab by mouth daily. 1 Tab  
    
   
   
   
  
 TOPROL XL 25 mg XL tablet Generic drug:  metoprolol succinate Your last dose was: Last dose given on 2/13 at 9:15 a.m. Take 12.5 mg by mouth daily. Hold for sbp less than or equal to 100  
 12.5 mg  
    
   
   
   
  
 triamcinolone acetonide 0.1 % topical cream  
Commonly known as:  KENALOG Your last dose was: Last dose given on 2/13 at 9:15 a.m. Apply  to affected area two (2) times a day. use thin layer  Under breast and to abdominal folds VITAMIN D3 1,000 unit Cap Generic drug:  cholecalciferol Your last dose was: Last dose given on 2/13 at 9:15 a.m. Take 1,000 Units by mouth daily. 1000 Units VOLTAREN 1 % Gel Generic drug:  diclofenac Your last dose was: Last dose given on 2/13 at 9:15 a.m. Apply 2 g to affected area three (3) times daily. Apply to bilateral shoulders 2 g ZEASORB AF 2 % topical powder Generic drug:  miconazole Your last dose was:  Not given in the hospital.  
   
 Apply  to affected area two (2) times a day. Apply over the top of nystatin and triamcinolone cream under breast and abdominal folds * Notice: This list has 3 medication(s) that are the same as other medications prescribed for you. Read the directions carefully, and ask your doctor or other care provider to review them with you. STOP taking these medications   
 aspirin delayed-release 325 mg tablet DITROPAN XL 10 mg CR tablet Generic drug:  oxybutynin chloride XL  
   
  
 nystatin topical cream  
Commonly known as:  MYCOSTATIN Replaced by:  nystatin powder TAMIFLU 30 mg capsule Generic drug:  oseltamivir VICTOZA 2-ESTEFANÍA SC Where to Get Your Medications Information on where to get these meds will be given to you by the nurse or doctor. ! Ask your nurse or doctor about these medications  
  cefdinir 300 mg capsule  
 nystatin powder Discharge Instructions HOSPITALIST DISCHARGE INSTRUCTIONS 
NAME: Xiomara Gill :  5/3/1930 MRN:  [id number] Date/Time:  2018 8:14 AM 
 
ADMIT DATE: 2018 DISCHARGE DATE: 2018 PRINCIPAL DISCHARGE DIAGNOSES: 
Hematuria (bloody urine) Urinary tract infection MEDICATIONS: 
· It is important that you take the medication exactly as they are prescribed. Note the changes and additions to your medications. Be sure you understand these changes before you are discharged today. · Keep your medication in the bottles provided by the pharmacist and keep a list of the medication names, dosages, and times to be taken in your wallet. · Do not take other medications without consulting your doctor. Pain Management: per above medications What to do at [facility] Recommended diet:  Resume previous diet Recommended activity: Activity as tolerated, with physical therapy If you experience any of the following symptoms then please call your primary care physician or return to the emergency room if you cannot get hold of your doctor: 
Fever, chills, severe abdominal pain, nausea, vomiting, diarrhea, worsening bleeding from urine, weakness, diziness, severe chest pain, shortness of breath, or other severe concerning symptoms that brought you to the hospital in the first place Follow Up: Follow-up Information Follow up With Details Comments Contact [address] 
[phone]  
 follow up with primary care physician In 1 week Ke Ibarra MD In 3 days for bloody urine, with history of bladder cancer [address] [address] 
[phone] Information obtained by : 
I understand that if any problems occur once I am at home I am to contact my physician. I understand and acknowledge receipt of the instructions indicated above. Physician's or R.N.'s Signature                                                                  Date/Time Patient or Representative Signature                                                          Date/Time PlayFirstt Announcement We are excited to announce that we are making your provider's discharge notes available to you in Yellow Chip. You will see these notes when they are completed and signed by the physician that discharged you from your recent hospital stay. If you have any questions or concerns about any information you see in PlayFirstt, please call the Health Information Department where you were seen or reach out to your Primary Care Provider for more information about your plan of care. Introducing [facility]! Juan Valentin introduces Yellow Chip patient portal. Now you can access parts of your medical record, email your doctor's office, and request medication refills online. 1. In your internet browser, go to [email or web address] 2. Click on the First Time User? Click Here link in the Sign In box. You will see the New Member Sign Up page. 3. Enter your Yellow Chip Access Code exactly as it appears below. You will not need to use this code after youve completed the sign-up process. If you do not sign up before the expiration date, you must request a new code. · Yellow Chip Access Code: [email or web address] Expires: 5/12/2018  8:27 AM 
 
4. Enter the last four digits of your Social Security Number (xxxx) and Date of Birth (mm/dd/yyyy) as indicated and click Submit. You will be taken to the next sign-up page. 5. Create a Yellow Chip ID. This will be your Yellow Chip login ID and cannot be changed, so think of one that is secure and easy to remember. 6. Create a [employer] password. You can change your password at any time. 7. Enter your Password Reset Question and Answer. This can be used at a later time if you forget your password. 8. Enter your e-mail address. You will receive e-mail notification when new information is available in [address] 9. Click Sign Up. You can now view and download portions of your medical record. 10. Click the Download Summary menu link to download a portable copy of your medical information. If you have questions, please visit the Frequently Asked Questions section of the [employer] website. Remember, Amerip[employer]ime is NOT to be used for urgent needs. For medical emergencies, dial 911. Now available from your iPhone and Android! Providers Seen During Your Hospitalization Provider Specialty Primary office phone Antonio Martinez MD Emergency Medicine [phone] Stephane Espinoza MD Internal Medicine [phone] Your Primary Care Physician (PCP) Primary Care Physician Office Phone Office Fax OTHER, PHYS ** None ** ** None ** You are allergic to the following Allergen Reactions Mercury (Bulk) Unknown (comments) Recent Documentation Height Weight BMI OB Status Smoking Status 1.626 m 99.4 kg 37.63 kg/m2 Menopause Former Smoker Emergency Contacts Name Discharge Info Relation Home Work Mobile Selena Felix CAREGIVER [3] Child [2] [phone] [address] CAREGIVER [3] Other Relative [6]   [phone] Patient Belongings The following personal items are in your possession at time of discharge: 
     Visual Aid: None Please provide this summary of care documentation to your next provider. Signatures-by signing, you are acknowledging that this After Visit Summary has been reviewed with you and you have received a copy.   
  
 
  
    
    
 Patient Signature: ____________________________________________________________ Date:  ____________________________________________________________  
  
Cherylle Cumins Provider Signature:  ____________________________________________________________ Date:  ____________________________________________________________

## 2018-02-11 NOTE — PROGRESS NOTES
Patient incontinent of urine, bloody. Assisted on bedpan. Extensive excoriation to sacral, zackary, and groin area. Son and patient report a long history with yeast skin infections due to long term antibiotic use.   1300: Put patient on hospital bed. Cleansed, and assisted with bedpan again. 1520: Post void residual 18cc. Documented in doc flow sheets. Patient's son brought her dentures. Upon awaken patient is more confused regarding place and situation. Son reports that she's supposed to wear a cpap machine, but typically refuses. 1730: Dr. Marie Momin notified of patients rising BP, and needing to resume all of patients home medications. MD stated to place the metoprolol order as she normally takes it, and then he will address further medications. TRANSFER - OUT REPORT:    Verbal report given to John Cespedes Rn(name) on Xander Sizer  being transferred to [facility](unit) for routine progression of care       Report consisted of patients Situation, Background, Assessment and   Recommendations(SBAR). Information from the following report(s) SBAR, Kardex, [facility] and Recent Results was reviewed with the receiving nurse. Lines:   Peripheral IV 02/11/18 Right Forearm (Active)   Site Assessment Clean, dry, & intact 2/11/2018 11:55 AM   Phlebitis Assessment 0 2/11/2018 11:55 AM   Infiltration Assessment 0 2/11/2018 11:55 AM   Dressing Status Clean, dry, & intact 2/11/2018 11:55 AM   Dressing Type Transparent;Tape 2/11/2018 11:55 AM   Hub Color/Line Status Pink; Infusing 2/11/2018 11:55 AM   Alcohol Cap Used Yes 2/11/2018 11:55 AM        Opportunity for questions and clarification was provided.       Patient transported with:   Registered Nurse

## 2018-02-11 NOTE — ED NOTES
Unclear where blood is coming from, rectum vs vaginal. Pt with extensive rash, yeast or fungal to zackary and sacrum area.

## 2018-02-11 NOTE — CONSULTS
Consult received, patient examined, chart reviewed. 81 yo WF w/ hx of bladder cancer followed by Dr. James George, paroxysmal afib NOT on anticoagulation presenting with significant amount of hematuria, found to have UTI and afib with RVR. Agree with IV CTX, follow culture and Hg. Urology consult given history of bladder cancer, may need CT pelvis.

## 2018-02-11 NOTE — PROGRESS NOTES
[facility]: MED RECONCILIATION    Comments/Recommendations:   PTA list has been updated from paperwork provided by [facility]. Pharmacist could only located pages 2 to 16 of the paperwork. Page 1 of the paperwork is missing. Pharmacist called the facility to confirm that no medications were listed on page one the nurse could not come to the phone and will call the pharmacist back. Addendum 18\  Spoke with nurse at facility - List created below is complete    Allergies: Mercury (bulk)    Prior to Admission Medications:   Prior to Admission Medications   Prescriptions Last Dose Informant Patient Reported? Taking? HYDROcodone-acetaminophen (NORCO) 5-325 mg per tablet 2018  Yes Yes   Sig: Take 1 Tab by mouth two (2) times daily as needed for Pain. LIRAGLUTIDE (VICTOZA 2-ESTEFANÍA SC) 2/10/2018 at Unknown time  Yes Yes   Si.2 mg by SubCUTAneous route daily. OTHER 2/10/2018 at Unknown time  Yes Yes   Sig: Cranberry 450 mg twice daily   White Petrolatum-Mineral Oil (REFRESH P.M.) 57.3-42.5 % oint ophthalmic ointment 2/10/2018 at Unknown time  Yes Yes   Sig: Administer  to both eyes nightly. alendronate (FOSAMAX) 70 mg tablet 2018  Yes Yes   Sig: Take 70 mg by mouth every Wednesday. ascorbic acid (VITAMIN C) 500 mg tablet 2/10/2018 at Unknown time Transfer Papers No Yes   Sig: Take 1 Tab by mouth two (2) times a day. aspirin delayed-release 325 mg tablet 2/10/2018 at Unknown time  No Yes   Sig: Take 1 Tab by mouth daily. calcium carbonate (TUMS) 200 mg calcium (500 mg) chew 2/10/2018 at   Transfer Papers Yes Yes   Sig: Take 1 Tab by mouth two (2) times a day. cholecalciferol (VITAMIN D3) 1,000 unit cap 2/10/2018 at Unknown time  Yes Yes   Sig: Take 1,000 Units by mouth daily. cycloSPORINE (RESTASIS) 0.05 % ophthalmic emulsion 2/10/2018 at Unknown time  Yes Yes   Sig: Administer 1 Drop to both eyes two (2) times a day.    diclofenac (VOLTAREN) 1 % gel 2/10/2018 at Unknown time  Yes Yes Sig: Apply 2 g to affected area daily. Apply to bilateral shoulders   famotidine (PEPCID) 20 mg tablet 2/10/2018 at Unknown time  Yes Yes   Sig: Take 20 mg by mouth nightly. insulin aspart (NOVOLOG) 100 unit/mL injection 2/10/2018 at Unknown time  Yes Yes   Sig: by SubCUTAneous route Before breakfast, lunch, and dinner. Sliding scale  160 to 199 give 2 units  200 to 249 give 3 units  250 to 299 give 5 units  300 to 349 give 7 units  350 to 399 give 9 units  Greater than 400 call MD   insulin aspart (NOVOLOG) 100 unit/mL injection 2/10/2018 at Unknown time  Yes Yes   Sig: by SubCUTAneous route nightly. Sliding scale  200 to 249 give 2 units  250 to 299 give 3 units  300 to 349 give 4 units  350 to 399 give 5 units  Greater than 400 call MD   insulin aspart (NOVOLOG) 100 unit/mL injection 2/10/2018 at Unknown time  Yes Yes   Si Units by SubCUTAneous route Before breakfast, lunch, and dinner. insulin detemir (LEVEMIR) 100 unit/mL injection 2/10/2018 at 9am  Yes Yes   Si Units by SubCUTAneous route daily. ketoconazole (NIZORAL) 2 % shampoo 2018  Yes Yes   Sig: Apply  to affected area two (2) days a week. On Monday and Thursday   loratadine (CLARITIN) 10 mg tablet 2/10/2018 at Unknown time  Yes Yes   Sig: Take 10 mg by mouth daily. metoprolol succinate (TOPROL XL) 25 mg XL tablet 2/10/2018 at Unknown time Transfer Papers Yes Yes   Sig: Take 12.5 mg by mouth daily. Hold for sbp less than or equal to 100   miconazole (ZEASORB AF) 2 % topical powder 2/10/2018 at Unknown time  Yes Yes   Sig: Apply  to affected area two (2) times a day. Apply over the top of nystatin and triamcinolone cream under breast and abdominal folds   nystatin (MYCOSTATIN) topical cream 2/10/2018 at Unknown time  Yes Yes   Sig: Apply  to affected area two (2) times a day. Under breast and abdominal folds   oseltamivir (TAMIFLU) 30 mg capsule 2/10/2018 at Unknown time  Yes Yes   Sig: Take 30 mg by mouth daily.    oxybutynin chloride XL (DITROPAN XL) 10 mg CR tablet 2/10/2018 at Unknown time  Yes Yes   Sig: Take 10 mg by mouth daily. phenol (CHLORASEPTIC) 0.5 % spra   No Yes   Si Spray by Mucous Membrane route four (4) times daily as needed. polyethylene glycol (MIRALAX) 17 gram/dose powder 2/10/2018 at Unknown time Transfer Papers Yes Yes   Sig: Take 17 g by mouth nightly. Mix in 8 oz of water or juice. Hold if loose stools develop   therapeutic multivitamin (THERAGRAN) tablet 2/10/2018 at Unknown time Transfer Papers Yes Yes   Sig: Take 1 Tab by mouth daily. triamcinolone acetonide (KENALOG) 0.1 % topical cream 2/10/2018 at Unknown time  Yes Yes   Sig: Apply  to affected area two (2) times a day.  use thin layer   Under breast and to abdominal folds      Facility-Administered Medications: None      Thank you,      Concha Katz, PharmD, BCPS

## 2018-02-11 NOTE — H&P
[facility]  [address], [facility] [address]  [phone]    Hospitalist Admission Note      NAME:  Xander Bautista   :   5/3/1930   MRN:  [id number]     PCP:  Keyona Patel MD     Date/Time:  2018 2:18 PM          Subjective:     CHIEF COMPLAINT: hematuria    HISTORY OF PRESENT ILLNESS:     Ms. Blessing Watkins is a 80 y.o. female w/ hx of bladder cancer, paroxysmal a-fib not on [facility] who presents with gross hematuria. Pt is a resident at [facility] and staff there found significant amount of blood in her Depends today. Pt reports lower abdominal pain but no dysuria, frequency, fevers, chills. States that the only reason why she came to ED was due to hematuria. ED workup showed hematuria. Hg normal. Initial vital signs showed tachycardia with hypotension, later improving. Ms. Blessing Watkins is admitted for further evaluation and management.       Past Medical History:   Diagnosis Date    Anxiety     Bladder neoplasm     Cataracts, bilateral     Chronic pain     R shoulder arthritis    COPD, mild (HCC)     Coronary artery occlusion (HCC)     Dermatitis     Diabetes ([address]     DM (diabetes mellitus) (HCC)     GERD (gastroesophageal reflux disease)     Hyperlipidemia     Hypertension     Narcolepsy     Narcolepsy     Neuropathy     Osteoarthritis     Osteoarthrosis     Other screening mammogram 2015    BIRADS 1:  Negative  repeat 1 year    Pain in joint, shoulder region     Pruritic disorder     Reflux esophagitis     Sepsis ([address]     Sleep apnea         Past Surgical History:   Procedure Laterality Date    HX APPENDECTOMY      HX CYSTECTOMY  1963    breast right    HX LAP CHOLECYSTECTOMY         Social History   Substance Use Topics    Smoking status: Former Smoker    Smokeless tobacco: Never Used    Alcohol use No        Family History   Problem Relation Age of Onset    Diabetes Mother     Hypertension Mother         Allergies   Allergen Reactions  Mercury (Bulk) Unknown (comments)        Prior to Admission medications    Medication Sig Start Date End Date Taking? Authorizing Provider   LIRAGLUTIDE (VICTOZA 2-ESTEFANÍA SC) 1.2 mg by SubCUTAneous route daily. Yes Jude Walton MD   oxybutynin chloride XL (DITROPAN XL) 10 mg CR tablet Take 10 mg by mouth daily. Yes Jude Walton MD   famotidine (PEPCID) 20 mg tablet Take 20 mg by mouth nightly. Yes Jude Walton MD   alendronate (FOSAMAX) 70 mg tablet Take 70 mg by mouth every Wednesday. Yes Jude Walton MD   ketoconazole (NIZORAL) 2 % shampoo Apply  to affected area two (2) days a week. On Monday and Thursday   Yes Jude Walton MD   loratadine (CLARITIN) 10 mg tablet Take 10 mg by mouth daily. Yes Jude Walton MD   HYDROcodone-acetaminophen (NORCO) 5-325 mg per tablet Take 1 Tab by mouth two (2) times daily as needed for Pain. Yes Jude Walton MD   triamcinolone acetonide (KENALOG) 0.1 % topical cream Apply  to affected area two (2) times a day. use thin layer   Under breast and to abdominal folds   Yes Jude Walton MD   cholecalciferol (VITAMIN D3) 1,000 unit cap Take 1,000 Units by mouth daily. Yes Jude Walton MD   diclofenac (VOLTAREN) 1 % gel Apply 2 g to affected area daily. Apply to bilateral shoulders   Yes Jude Walton MD   miconazole (ZEASORB AF) 2 % topical powder Apply  to affected area two (2) times a day. Apply over the top of nystatin and triamcinolone cream under breast and abdominal folds   Yes MD EDDIE Lizama Cranberry 450 mg twice daily   Yes Historical Provider   insulin aspart (NOVOLOG) 100 unit/mL injection by SubCUTAneous route Before breakfast, lunch, and dinner. Sliding scale  160 to 199 give 2 units  200 to 249 give 3 units  250 to 299 give 5 units  300 to 349 give 7 units  350 to 399 give 9 units  Greater than 400 call MD   Yes Historical Provider   insulin aspart (NOVOLOG) 100 unit/mL injection by SubCUTAneous route nightly.  Sliding scale  200 to 249 give 2 units  250 to 299 give 3 units  300 to 349 give 4 units  350 to 399 give 5 units  Greater than 400 call MD   Yes Historical Provider   insulin aspart (NOVOLOG) 100 unit/mL injection 4 Units by SubCUTAneous route Before breakfast, lunch, and dinner. Yes Historical Provider   nystatin (MYCOSTATIN) topical cream Apply  to affected area two (2) times a day. Under breast and abdominal folds   Yes Historical Provider   White Petrolatum-Mineral Oil (REFRESH P.M.) 57.3-42.5 % oint ophthalmic ointment Administer  to both eyes nightly. Yes Historical Provider   oseltamivir (TAMIFLU) 30 mg capsule Take 30 mg by mouth daily. 2/9/18  Yes Historical Provider   phenol (CHLORASEPTIC) 0.5 % spra 1 Spray by Mucous Membrane route four (4) times daily as needed. 9/30/17  Yes JADA Burrows   insulin detemir (LEVEMIR) 100 unit/mL injection 20 Units by SubCUTAneous route daily. Yes Jude Walton MD   cycloSPORINE (RESTASIS) 0.05 % ophthalmic emulsion Administer 1 Drop to both eyes two (2) times a day. Yes Jude Walton MD   aspirin delayed-release 325 mg tablet Take 1 Tab by mouth daily. 11/27/15  Yes Ab Zazueta MD   therapeutic multivitamin [facility]) tablet Take 1 Tab by mouth daily. Yes Historical Provider   metoprolol succinate (TOPROL XL) 25 mg XL tablet Take 12.5 mg by mouth daily. Hold for sbp less than or equal to 100   Yes Historical Provider   ascorbic acid (VITAMIN C) 500 mg tablet Take 1 Tab by mouth two (2) times a day. 7/15/15  Yes Evangelina Linares MD   polyethylene glycol [facility]) 17 gram/dose powder Take 17 g by mouth nightly. Mix in 8 oz of water or juice. Hold if loose stools develop   Yes Historical Provider   calcium carbonate (TUMS) 200 mg calcium (500 mg) chew Take 1 Tab by mouth two (2) times a day.    Yes Historical Provider       Review of Systems:  (bold if positive, if negative)    Gen:  Eyes:  ENT:  CVS:  Pulm:  GI:  Abdominal pain,  :  Hematuria  MS:  Skin:  Psych:  Endo:    Hem:  Renal:    Neuro: Objective:      VITALS:    Vital signs reviewed; most recent are:    Visit Vitals    /82    Pulse 94    Temp 97.8 °F (36.6 °C)    Resp 22    Ht 5' 4\" (1.626 m)    Wt 104.8 kg (231 lb)    SpO2 94%    BMI 39.65 kg/m2     SpO2 Readings from Last 6 Encounters:   02/11/18 94%   10/01/17 95%   02/28/17 98%   11/27/15 95%   09/27/15 95%   09/02/15 (!) 89%          Intake/Output Summary (Last 24 hours) at 02/11/18 1418  Last data filed at 02/11/18 0847   Gross per 24 hour   Intake                0 ml   Output               50 ml   Net              -50 ml            Exam:     Physical Exam:    Gen: obese, elderly, chronically ill-appearing  HEENT:  No scleral icterus, hearing intact to voice, moist mucous membranes  Neck:  Supple, without masses  Resp:  No accessory muscle use. CTAB without wheezing, rales, rhonchi  Card: tachycardic, irregular rhythm. Without murmurs, rubs, or gallops. No peripheral lower extremity edema. No JVD. Peripheral pulses in tact. Abd:  Normoactive bowel sounds. Soft, non-tender, non-distended. No rebound, no guarding. Musc:  No cyanosis or clubbing  Skin: multiple areas of skin erythema with superficial ulcer noted on sacral area. Areas of candidiasis / redness under abdominal /breast folds and inguinal region. Cap refill ~2 secs  Neuro:  Cranial nerves are grossly intact, no focal motor weakness, follows commands appropriately  Psych:  Limited insight, normal affect. Alert, oriented to self and hospital. Answers questions appropriately       Labs:    Recent Labs      02/11/18   0757   WBC  7.8   HGB  12.9   HCT  39.7   PLT  183     Recent Labs      02/11/18   0757   NA  138   K  4.2   CL  104   CO2  27   GLU  172*   BUN  13   CREA  1.00   CA  8.9   MG  1.7   ALB  2.6*   SGOT  51*   ALT  30     No components found for: GLPOC  No results for input(s): PH, PCO2, PO2, HCO3, FIO2 in the last 72 hours. No results for input(s): INR in the last 72 hours.     No lab exists for component: INREXT  Lab Results   Component Value Date/Time    Specimen Description: BLOOD 03/25/2011 05:55 AM    Specimen Description: URINE 05/10/2010 12:38 AM    Specimen Description: URINE 08/25/2009 02:00 PM     Lab Results   Component Value Date/Time    Culture result: NORMAL RESPIRATORY SANTA/NO BETA STREP ISOLATED 09/30/2017 10:23 PM    Culture result: MRSA NOT PRESENT 11/24/2015 05:45 AM    Culture result:  11/24/2015 05:45 AM         Screening of patient nares for MRSA is for surveillance purposes and, if positive, to facilitate isolation considerations in high risk settings. It is not intended for automatic decolonization interventions per se as regimens are not sufficiently effective to warrant routine use. All other current labs reviewed in the computer. Imaging/Studies:    CXR: *Mild interstitial edema. Bilateral severe glenohumeral osteoarthritis. Imaging personally reviewed    EKG: ?sinus tach, QTc 515. RBBB  Tele: AFib with RVR  personally reviewed         Assessment / Plan:       81 yo WF w/ hx of bladder cancer followed by Dr. Sandra Montejo, paroxysmal afib NOT on anticoagulation, DM, HTN presenting with significant amount of hematuria, found to have UTI and afib with RVR      Hematuria: with pyuria and UTI. Hx of bladder cancer  -- IV CTX, follow urine culture  -- follow Hg  -- appreciate urology eval, CT pending.  Discussed with Dr. Presley Abad      Paroxysmal afib: in and out of afib  -- hoping to control rate with IVF alone      Diabetes mellitus type 2 with neurological manifestations ([address] () /   Diabetic sensorimotor neuropathy ([address]   -- use Lantus here, decrease dose  -- SSI, send A1c  -- can use gabapentin PRN      DJD (degenerative joint disease) (3/27/2011)  -- Diclofenac topical   -- home PO opioids PRN severe pain      Essential hypertension (3/27/2011): BP low/normal  -- hold antihypertensives      GERD (gastroesophageal reflux disease) (3/27/2011)  -- cont H2B      Obstructive sleep apnea (3/27/2011)  -- CPAP QHS    Depression with anxiety (3/27/2011)  -- not on meds; monitor      Pressure ulcers / candidiasis:  -- nystatin powder, frequent turns, wound care consult    Code Status: DNR, d/w son     Surrogate decision maker: son      Previous notes and lab results reviewed.        Total time spent with patient: 79 Minutes     Risk of deterioration: High                 Care Plan discussed with: ED provider, Patient, Family, Nursing Staff, Consultant/Specialist and >50% of time spent in counseling and coordination of care    Discussed:  Code Status, Care Plan and D/C Planning    Prophylaxis:  SCD's and H2B/PPI    Disposition:  SNF/LTC       ___________________________________________________    Attending Physician: Blanka Quevedo MD

## 2018-02-11 NOTE — PROGRESS NOTES
CT scan reviewed: no evidence of clot retention, bladder not overly distended, no stones or hydronephrosis  Some posterior bladder wall thickening - will need to follow up with Dr. Ildefonso Esteban for cystoscopy, possible TURBT. Can avoid sánchez if emptying bladder.

## 2018-02-11 NOTE — ED PROVIDER NOTES
HPI Comments: 80 y.o. female with past medical history significant for bladder neoplasm, COPD, DM, HTN, neuropathy, narcolepsy, GERD, anxiety,and sleep apnea who presents from [address] home via EMS with chief complaint of blood in diaper. PT reports that she was found to have blood in her diaper ~ 1 hour ago this morning but notes that she has not had any complaints other than urinary frequency and \"spitting up\" which she states is different than vomiting. Pt denies any h/o similar bleeding. Pt denies knowledge of being on any blood thinners. Pt denies the following: abdominal pain, N/V/D, fever, appetite change. Pt denies being s/p hysterectomy. Son notes that the pt's facility has been treating the pt for \"a long time\" for rash on buttocks. She was also recently taken off of tamiflu which she was taking as a prophylactic. There are no other acute medical concerns at this time. Social hx: Former smoker. No alcohol use. Note written by Carlos Blackwell, as dictated by Lurdes Clarke MD 8:00 AM    The history is provided by the patient and a relative (Son). No  was used.         Past Medical History:   Diagnosis Date    Anxiety     Bladder neoplasm     Cataracts, bilateral     Chronic pain     R shoulder arthritis    COPD, mild (HCC)     Coronary artery occlusion (HCC)     Dermatitis     Diabetes ([address]     DM (diabetes mellitus) (HCC)     GERD (gastroesophageal reflux disease)     Hyperlipidemia     Hypertension     Narcolepsy     Narcolepsy     Neuropathy     Osteoarthritis     Osteoarthrosis     Other screening mammogram 08/31/2015    BIRADS 1:  Negative  repeat 1 year    Pain in joint, shoulder region     Pruritic disorder     Reflux esophagitis     Sepsis ([address]     Sleep apnea        Past Surgical History:   Procedure Laterality Date    HX APPENDECTOMY      HX CYSTECTOMY  1963    breast right    HX LAP CHOLECYSTECTOMY           Family History: Problem Relation Age of Onset    Diabetes Mother     Hypertension Mother        Social History     Social History    Marital status:      Spouse name: N/A    Number of children: N/A    Years of education: N/A     Occupational History    Not on file. Social History Main Topics    Smoking status: Former Smoker    Smokeless tobacco: Never Used    Alcohol use No    Drug use: No    Sexual activity: Not Currently     Birth control/ protection: None     Other Topics Concern    Not on file     Social History Narrative         ALLERGIES: Mercury (bulk)    Review of Systems   Constitutional: Negative for appetite change and fever. Cardiovascular: Negative for chest pain. Gastrointestinal: Positive for blood in stool (blood in diaper). Negative for abdominal pain, diarrhea, nausea and vomiting. Genitourinary: Positive for hematuria (blood in diaper). Musculoskeletal: Negative for back pain. Skin: Positive for rash (chronic to buttocks). All other systems reviewed and are negative. Vitals:    02/11/18 0815 02/11/18 0816 02/11/18 0830 02/11/18 0845   BP: (!) 80/55  110/63 105/83   Pulse: (!) 128 (!) 131 (!) 125 (!) 127   Resp: 14 21 23 23   Temp:       SpO2:  93% 93% 94%   Weight:       Height:                Physical Exam   Constitutional: She is oriented to person, place, and time. She appears well-developed. No distress. Elderly and obese. Debilitated and bed-ridden. HENT:   Head: Normocephalic and atraumatic. Eyes: Conjunctivae are normal. No scleral icterus. Neck: Neck supple. No tracheal deviation present. Cardiovascular: Normal rate, regular rhythm, normal heart sounds and intact distal pulses. Exam reveals no gallop and no friction rub. No murmur heard. Pulmonary/Chest: Effort normal and breath sounds normal. She has no wheezes. She has no rales. Abdominal: Soft. She exhibits no distension. There is no tenderness. There is no rebound and no guarding. Genitourinary:   Genitourinary Comments: Bright red blood in diaper. No obvious source of bleeding from skin or rectum. Musculoskeletal: She exhibits no edema. Neurological: She is alert and oriented to person, place, and time. Skin: Skin is warm and dry. No rash noted. Extensive erythematous rash in peritoneal region and buttocks consistent with yeast infection. Psychiatric: She has a normal mood and affect. Nursing note and vitals reviewed. Note written by Carlos Yoon, as dictated by Nathan Bauer MD 8:05 AM      [facility]      ED Course       Procedures    ED EKG interpretation:  Rhythm: sinus tachycardia with RBBB. Rate (approx.): 129; Axis: normal; ST/T wave: normal.   Note written by Carlos Yoon, as dictated by Nathan Bauer MD 9:00 AM      ED EKG interpretation:  Rhythm: normal sinus rhythm with RBBB. Rate (approx.): 94; Axis: normal; ST/T wave: normal.   Note written by Carlos Yoon, as dictated by Nathan Bauer MD 9:01 AM    CONSULT NOTE:  9:38 Lucila Joseph MD spoke with Dr. Marty Smalls, Consult for Hospitalist.  Discussed available diagnostic tests and clinical findings. He is in agreement with care plans as outlined. Dr. Marty Smalls will see and admit the pt. A/P: hematuria/UTI with hx bladder CA - Rocephin and admit as she has been tachycardic.   Nathan Bauer MD

## 2018-02-11 NOTE — CONSULTS
Requesting Provider: Robina Sheehan MD  Reason for Consultation: Davi Copping of bladder cancer\"  Pre-existing [facility] Patient:   No                Patient: Jose De Jesus Melendez MRN: [id number]  SSN: [id number]    YOB: 1930  Age: 80 y.o. Sex: female     Location: ER18/18       Code Status: DNR   PCP: Jude Walton, MD  - None   Emergency Contact:  Primary Emergency Contact: Jessi Connie, Jude Phone: [phone]   Race/Synagogue/Language: [facility] / Angel Or / Lurdes Ackerman   Payor: Payor: Jacqui Stager / Plan: David Large / Product Type: Medicare /    Prior Admission Data: 10/1/17 [facility] EMERGENCY DEPT     Hospitalized:  Hospital Day: 1 - Admitted 2018  7:48 AM   POD # * No surgery found *  by * Surgery not found * - Blood Loss: * No surgery found * * Surgery not found *     CONSULTANTS  IP CONSULT TO HOSPITALIST  IP CONSULT TO UROLOGY   ADMISSION DIAGNOSES  No diagnosis found. Assessment/Plan:     · Gross hematuria  · Hx of bladder cancer    -ua/ucx  -post void residual  -if elevated, may need placement of 24fr 3 way with manual irrigation of clots, and possible CBI  -CT stone protocol to evaluate upper tracts given borderline GFR     CC: Melena   HPI: She is a 80 y.o. female with PMH of afib, bladder cancer, nephrolithiasis reports that she presented here for sores on her bottom. She was unaware she had gross hematuria until today. She has a hx of bladder cancer followed by Dr. James George. She reports no tumors in the last 10 years and has not had a cystoscopy in years. She reports hesitancy, sensation of incomplete emptying. She was straight cathed in ER for urine specimen. Does not currently have a sánchez. No urine available at bedside to view. Appears to have altered mental status, possibly secondary to UTI. Poor historian.     Temp (24hrs), Av.1 °F (36.7 °C), Min:97.8 °F (36.6 °C), Max:98.4 °F (36.9 °C)    Creatinine   Date/Time Value Ref Range Status   02/11/2018 07:57 AM 1.00 0.55 - 1.02 MG/DL Final   11/27/2015 05:14 AM 1.22 (H) 0.55 - 1.02 MG/DL Final     Comment:     ** Note new reference range and method **   11/26/2015 04:55 AM 1.27 (H) 0.55 - 1.02 MG/DL Final     Comment:     ** Note new reference range and method **   11/25/2015 04:50 AM 1.35 (H) 0.55 - 1.02 MG/DL Final     Comment:     ** Note new reference range and method **   11/24/2015 04:50 AM 1.62 (H) 0.55 - 1.02 MG/DL Final     Comment:     ** Note new reference range and method **     Current Antimicrobial Therapy (168h ago through future)    Ordered     Start Stop    02/11/18 1003  cefTRIAXone (ROCEPHIN) 1 g in 0.9% sodium chloride (MBP/ADV) 50 mL  1 g,   IntraVENous,   EVERY 24 HOURS      02/12/18 1000 --        Diet: DIET DIABETIC CONSISTENT CARB Soft Solids -    Urinary Status: Frequency     Labs     Lab Results   Component Value Date/Time    Lactic acid 1.7 02/11/2018 10:25 AM    Lactic acid 2.0 11/24/2015 11:55 AM    Lactic acid 1.7 11/23/2015 03:05 PM    WBC 7.8 02/11/2018 07:57 AM    HCT 39.7 02/11/2018 07:57 AM    PLATELET 368 22/84/6808 07:57 AM    Sodium 138 02/11/2018 07:57 AM    Potassium 4.2 02/11/2018 07:57 AM    Chloride 104 02/11/2018 07:57 AM    CO2 27 02/11/2018 07:57 AM    BUN 13 02/11/2018 07:57 AM    Creatinine 1.00 02/11/2018 07:57 AM    Glucose 172 (H) 02/11/2018 07:57 AM    Calcium 8.9 02/11/2018 07:57 AM    Magnesium 1.7 02/11/2018 07:57 AM    INR 1.2 (H) 03/25/2011 05:00 AM     UA: Lab Results   Component Value Date/Time    Color RED 02/11/2018 08:45 AM    Appearance BLOODY (A) 02/11/2018 08:45 AM    Specific gravity 1.015 02/11/2018 08:45 AM    pH (UA) 6.0 02/11/2018 08:45 AM    Protein 100 (A) 02/11/2018 08:45 AM    Glucose NEGATIVE  02/11/2018 08:45 AM    Ketone NEGATIVE  02/11/2018 08:45 AM    Bilirubin NEGATIVE  02/11/2018 08:45 AM    Urobilinogen 0.2 02/11/2018 08:45 AM    Nitrites NEGATIVE  02/11/2018 08:45 AM    Leukocyte Esterase LARGE (A) 02/11/2018 08:45 AM    Epithelial cells FEW 02/11/2018 08:45 AM    Bacteria 3+ (A) 02/11/2018 08:45 AM    WBC >100 (H) 02/11/2018 08:45 AM    RBC >100 (H) 02/11/2018 08:45 AM     Imaging     Results for orders placed during the hospital encounter of 02/28/17   CT SPINE CERV WO CONT    Narrative INDICATION:   fall with head trauma and neck injury    COMPARISON: None. TECHNIQUE:   Noncontrast axial CT imaging of the cervical spine was performed. Coronal and sagittal reconstructions were obtained. CT dose reduction was achieved through the use of a standardized protocol  tailored for this examination and automatic exposure control for dose  modulation. FINDINGS:    There is no evidence of acute osseous abnormality. There is no acute alignment  abnormality. There is anterolisthesis at C3-C4 measuring 5 mm. Vertebral body  heights are maintained. There is no appreciable prevertebral soft tissue  swelling or epidural hematoma. There is multilevel degenerative spondylosis. Evaluation of the paraspinal soft tissues demonstrates no significant pathology. The visualized lung apices are clear. Impression IMPRESSION:    1. No acute osseous abnormality. 2. Multilevel degenerative spondylosis. 3. Anterolisthesis at C3-C4. US Results (most recent):    Results from [place] encounter on 11/23/15   US RETROPERITONEUM COMP   Narrative **Final Report**      ICD Codes / Adm. Diagnosis: 599.0  298.9 / Urinary tract infection, site    Unspecified psychosis  Examination:  US RENAL  RETROPERITONEAL  - [id number] - Nov 24 2015  2:29PM  Accession No:  [id number]  Reason:  ANGEL, UTI      REPORT:  INDICATION:  ANGEL, UTI     EXAM: US Renal/Retroperitoneum    FINDINGS: Renal sonogram shows normal renal contours, cortical thickness,   and cortical echogenicity. There is no sonographically apparent solid mass   or cyst. Focal shadowing hyperechoic focus in the right kidney might   represent a nonobstructing stone.     Renal lengths:    Right  9.4cm  Left    10.4cm    There is no hydronephrosis or caliectasis. The proximal ureters are not   dilated. There is no perirenal fluid collection. The bladder is empty. Aorta and common iliac artery origins are obscured by   bowel gas. IVC is unremarkable. IMPRESSION: Normal sonographic appearance of the kidneys other than   questionable right nonobstructing calculus. Signing/Reading Doctor: Mariana Coffey ([id number])    Approved: Mariana Coffey ([id number])  Nov 24 2015  3:12PM                                   Cultures     All Micro Results     Procedure Component Value Units Date/Time    CULTURE, URINE [[id number]] Collected:  02/11/18 0845    Order Status:  Completed Updated:  02/11/18 0922           Past History: (Complete 2+/3 areas)     Allergies   Allergen Reactions    Mercury (Bulk) Unknown (comments)      Current Facility-Administered Medications   Medication Dose Route Frequency    sodium chloride (NS) flush 5-10 mL  5-10 mL IntraVENous Q8H    sodium chloride (NS) flush 5-10 mL  5-10 mL IntraVENous PRN    0.9% sodium chloride infusion  100 mL/hr IntraVENous CONTINUOUS    sodium chloride (NS) flush 5-10 mL  5-10 mL IntraVENous Q8H    sodium chloride (NS) flush 5-10 mL  5-10 mL IntraVENous PRN    naloxone (NARCAN) injection 0.4 mg  0.4 mg IntraVENous PRN    [START ON 2/12/2018] cefTRIAXone (ROCEPHIN) 1 g in 0.9% sodium chloride (MBP/ADV) 50 mL  1 g IntraVENous Q24H    insulin lispro (HUMALOG) injection   SubCUTAneous AC&HS    glucose chewable tablet 16 g  4 Tab Oral PRN    dextrose (D50W) injection syrg 12.5-25 g  12.5-25 g IntraVENous PRN    glucagon (GLUCAGEN) injection 1 mg  1 mg IntraMUSCular PRN    nystatin (MYCOSTATIN) 100,000 unit/gram powder   Topical TID     Current Outpatient Prescriptions   Medication Sig    LIRAGLUTIDE (VICTOZA 2-ESTEFANÍA SC) 1.2 mg by SubCUTAneous route daily.     oxybutynin chloride XL (DITROPAN XL) 10 mg CR tablet Take 10 mg by mouth daily.  famotidine (PEPCID) 20 mg tablet Take 20 mg by mouth nightly.  alendronate (FOSAMAX) 70 mg tablet Take 70 mg by mouth every Wednesday.  ketoconazole (NIZORAL) 2 % shampoo Apply  to affected area two (2) days a week. On Monday and Thursday    loratadine (CLARITIN) 10 mg tablet Take 10 mg by mouth daily.  HYDROcodone-acetaminophen (NORCO) 5-325 mg per tablet Take 1 Tab by mouth two (2) times daily as needed for Pain.  triamcinolone acetonide (KENALOG) 0.1 % topical cream Apply  to affected area two (2) times a day. use thin layer   Under breast and to abdominal folds    cholecalciferol (VITAMIN D3) 1,000 unit cap Take 1,000 Units by mouth daily.  diclofenac (VOLTAREN) 1 % gel Apply 2 g to affected area daily. Apply to bilateral shoulders    miconazole (ZEASORB AF) 2 % topical powder Apply  to affected area two (2) times a day. Apply over the top of nystatin and triamcinolone cream under breast and abdominal folds    OTHER Cranberry 450 mg twice daily    insulin aspart (NOVOLOG) 100 unit/mL injection by SubCUTAneous route Before breakfast, lunch, and dinner. Sliding scale  160 to 199 give 2 units  200 to 249 give 3 units  250 to 299 give 5 units  300 to 349 give 7 units  350 to 399 give 9 units  Greater than 400 call MD    insulin aspart (NOVOLOG) 100 unit/mL injection by SubCUTAneous route nightly. Sliding scale  200 to 249 give 2 units  250 to 299 give 3 units  300 to 349 give 4 units  350 to 399 give 5 units  Greater than 400 call MD    insulin aspart (NOVOLOG) 100 unit/mL injection 4 Units by SubCUTAneous route Before breakfast, lunch, and dinner.  nystatin (MYCOSTATIN) topical cream Apply  to affected area two (2) times a day. Under breast and abdominal folds    White Petrolatum-Mineral Oil (REFRESH P.M.) 57.3-42.5 % oint ophthalmic ointment Administer  to both eyes nightly.  oseltamivir (TAMIFLU) 30 mg capsule Take 30 mg by mouth daily.     phenol (CHLORASEPTIC) 0.5 % spra 1 Spray by Mucous Membrane route four (4) times daily as needed.  insulin detemir (LEVEMIR) 100 unit/mL injection 20 Units by SubCUTAneous route daily.  cycloSPORINE (RESTASIS) 0.05 % ophthalmic emulsion Administer 1 Drop to both eyes two (2) times a day.  aspirin delayed-release 325 mg tablet Take 1 Tab by mouth daily.  therapeutic multivitamin (THERAGRAN) tablet Take 1 Tab by mouth daily.  metoprolol succinate (TOPROL XL) 25 mg XL tablet Take 12.5 mg by mouth daily. Hold for sbp less than or equal to 100    ascorbic acid (VITAMIN C) 500 mg tablet Take 1 Tab by mouth two (2) times a day.  polyethylene glycol (MIRALAX) 17 gram/dose powder Take 17 g by mouth nightly. Mix in 8 oz of water or juice. Hold if loose stools develop    calcium carbonate (TUMS) 200 mg calcium (500 mg) chew Take 1 Tab by mouth two (2) times a day. Prior to Admission medications    Medication Sig Start Date End Date Taking? Authorizing Provider   LIRAGLUTIDE (VICTOZA 2-ESTEFANÍA SC) 1.2 mg by SubCUTAneous route daily. Yes Jude Walton MD   oxybutynin chloride XL (DITROPAN XL) 10 mg CR tablet Take 10 mg by mouth daily. Yes Jude Walton MD   famotidine (PEPCID) 20 mg tablet Take 20 mg by mouth nightly. Yes Jude Walton MD   alendronate (FOSAMAX) 70 mg tablet Take 70 mg by mouth every Wednesday. Yes Jude Walton MD   ketoconazole (NIZORAL) 2 % shampoo Apply  to affected area two (2) days a week. On Monday and Thursday   Yes Jude Walton MD   loratadine (CLARITIN) 10 mg tablet Take 10 mg by mouth daily. Yes Jude Walton MD   HYDROcodone-acetaminophen (NORCO) 5-325 mg per tablet Take 1 Tab by mouth two (2) times daily as needed for Pain. Yes Jude Walton MD   triamcinolone acetonide (KENALOG) 0.1 % topical cream Apply  to affected area two (2) times a day. use thin layer   Under breast and to abdominal folds   Yes Jude Walton MD   cholecalciferol (VITAMIN D3) 1,000 unit cap Take 1,000 Units by mouth daily.    Yes Phys MD Isabelle   diclofenac (VOLTAREN) 1 % gel Apply 2 g to affected area daily. Apply to bilateral shoulders   Yes Jude Walton MD   miconazole (ZEASORB AF) 2 % topical powder Apply  to affected area two (2) times a day. Apply over the top of nystatin and triamcinolone cream under breast and abdominal folds   Yes MD ISABELLE Lizama Cranberry 450 mg twice daily   Yes Historical Provider   insulin aspart (NOVOLOG) 100 unit/mL injection by SubCUTAneous route Before breakfast, lunch, and dinner. Sliding scale  160 to 199 give 2 units  200 to 249 give 3 units  250 to 299 give 5 units  300 to 349 give 7 units  350 to 399 give 9 units  Greater than 400 call MD   Yes Historical Provider   insulin aspart (NOVOLOG) 100 unit/mL injection by SubCUTAneous route nightly. Sliding scale  200 to 249 give 2 units  250 to 299 give 3 units  300 to 349 give 4 units  350 to 399 give 5 units  Greater than 400 call MD   Yes Historical Provider   insulin aspart (NOVOLOG) 100 unit/mL injection 4 Units by SubCUTAneous route Before breakfast, lunch, and dinner. Yes Historical Provider   nystatin (MYCOSTATIN) topical cream Apply  to affected area two (2) times a day. Under breast and abdominal folds   Yes Historical Provider   White Petrolatum-Mineral Oil (REFRESH P.M.) 57.3-42.5 % oint ophthalmic ointment Administer  to both eyes nightly. Yes Historical Provider   oseltamivir (TAMIFLU) 30 mg capsule Take 30 mg by mouth daily. 2/9/18  Yes Historical Provider   phenol (CHLORASEPTIC) 0.5 % spra 1 Spray by Mucous Membrane route four (4) times daily as needed. 9/30/17  Yes JADA Burrows   insulin detemir (LEVEMIR) 100 unit/mL injection 20 Units by SubCUTAneous route daily. Yes Jude Walton MD   cycloSPORINE (RESTASIS) 0.05 % ophthalmic emulsion Administer 1 Drop to both eyes two (2) times a day. Yes Jude Walton MD   aspirin delayed-release 325 mg tablet Take 1 Tab by mouth daily.  11/27/15  Yes Tiffani Staples MD   therapeutic multivitamin [facility]) tablet Take 1 Tab by mouth daily. Yes Historical Provider   metoprolol succinate (TOPROL XL) 25 mg XL tablet Take 12.5 mg by mouth daily. Hold for sbp less than or equal to 100   Yes Historical Provider   ascorbic acid (VITAMIN C) 500 mg tablet Take 1 Tab by mouth two (2) times a day. 7/15/15  Yes Hanny Garcia MD   polyethylene glycol [facility]) 17 gram/dose powder Take 17 g by mouth nightly. Mix in 8 oz of water or juice. Hold if loose stools develop   Yes Historical Provider   calcium carbonate (TUMS) 200 mg calcium (500 mg) chew Take 1 Tab by mouth two (2) times a day. Yes Historical Provider        PMHx:  has a past medical history of Anxiety; Bladder neoplasm; Cataracts, bilateral; Chronic pain; COPD, mild ([address] Coronary artery occlusion ([address] Dermatitis; Diabetes ([address] DM (diabetes mellitus) ([address] GERD (gastroesophageal reflux disease); Hyperlipidemia; Hypertension; Narcolepsy; Narcolepsy; Neuropathy; Osteoarthritis; Osteoarthrosis; Other screening mammogram (08/31/2015); Pain in joint, shoulder region; Pruritic disorder; Reflux esophagitis; Sepsis ([address] and Sleep apnea. PSurgHx:  has a past surgical history that includes hx appendectomy; hx cystectomy (1963); and hx lap cholecystectomy. PSocHx:  reports that she has quit smoking. She has never used smokeless tobacco. She reports that she does not drink alcohol or use illicit drugs.    ROS:  (Complete - 10 systems) - positives are bolded : Weightloss (Constitutional), Dry mouth (ENMT), Chest pain (CV), SOB (Respiratory), Constipation (GI), Weakness (MS), Pallor (Skin), TIA Sx (Neuro), Confusion (Psych), Easy bruising (Heme)    Physical Exam: (Comprehesive - 8+ 1995 Systems)     (1) Constitutional:  FIO2:   on SpO2: O2 Sat (%): 94 %  O2 Device: Room air    Patient Vitals for the past 24 hrs:   BP Temp Pulse Resp SpO2 Height Weight   02/11/18 1058 108/82 97.8 °F (36.6 °C) 94 22 94 % - -   02/11/18 0930 (!) 89/67 - (!) 120 21 90 % - - 02/11/18 0915 124/67 - (!) 125 23 94 % - -   02/11/18 0900 128/89 - 100 23 94 % - -   02/11/18 0845 105/83 - (!) 127 23 94 % - -   02/11/18 0830 110/63 - (!) 125 23 93 % - -   02/11/18 0816 - - (!) 131 21 93 % - -   02/11/18 0815 (!) 80/55 - (!) 128 14 - - -   02/11/18 0814 - - 90 15 - - -   02/11/18 0807 - - (!) 134 21 - - -   02/11/18 0800 - - (!) 132 18 93 % - -   02/11/18 0753 114/68 - - - - - -   02/11/18 0751 114/68 98.4 °F (36.9 °C) (!) 130 18 95 % 5' 4\" (1.626 m) 104.8 kg (231 lb)         Date 02/10/18 0700 - 02/11/18 0659(Not Admitted) 02/11/18 0700 - 02/12/18 0659   Shift 8860-4298 9029-4361 24 Hour Total 7565-6737 7351-3391 24 Hour Total   I  N  T  A  K  E   Shift Total  (mL/kg)         O  U  T  P  U  T   Urine    50  50      Urine    50  50      Urine Occurrence(s)    2 x  2 x    Stool            Stool Occurrence(s)    1 x  1 x    Shift Total  (mL/kg)    50  (0.5)  50  (0.5)   NET    -50  -50   Weight (kg)    104.8 104.8 104.8      (2) ENMT:   moist mucous membranes   (3) Respiratory:  breathing easily   (4) GI:  no abdominal masses, tenderness, no hepatosplenomegaly, no ventral hernia,   (5) :   No cvat   (6) Lymphatic:  no adenopathy   (7) Muscloskeletal:  no gross deformity   (8) Skin:  no rash   (9) Neuro:  no focal deficits, lethargic      Signed By: Eyal Post MD  - February 11, 2018

## 2018-02-11 NOTE — PROGRESS NOTES
6:49 PM  Primary Nurse Adal Leary, ANYA and Yennifer Oseguera RN performed a dual skin assessment on this patient Impairment noted- see wound doc flow sheet  Dank score is 13  Place pt on turn team    7:31 PM  Bedside and Verbal shift change report given to Murray Herrera (oncoming nurse) by Angélica Castillo (offgoing nurse). Report included the following information SBAR, MAR and Recent Results.

## 2018-02-11 NOTE — IP AVS SNAPSHOT
[address] [facility] 
 
 
 [address] [place] [address] [address] [facility] 
[phone] Patient: Rhona Patient MRN: [id number] KFO:4/2/1850 A check lena indicates which time of day the medication should be taken. My Medications START taking these medications Instructions Each Dose to Equal  
 Morning Noon Evening Bedtime  
 cefdinir 300 mg capsule Commonly known as:  OMNICEF Your last dose was: To begin after discharge. Take 1 Cap by mouth two (2) times a day for 7 days. 300 mg  
    
   
   
   
  
 nystatin powder Commonly known as:  MYCOSTATIN Replaces:  nystatin topical cream  
Your last dose was: Last dose given on 2/13 at 9:15 a.m. Apply  to affected area three (3) times daily. CONTINUE taking these medications Instructions Each Dose to Equal  
 Morning Noon Evening Bedtime  
 ascorbic acid (vitamin C) 500 mg tablet Commonly known as:  VITAMIN C Your last dose was: Last dose given on 2/13 at 9:15 a.m. Take 1 Tab by mouth two (2) times a day. 500 mg  
    
   
   
   
  
 calcium carbonate 200 mg calcium (500 mg) Chew Commonly known as:  TUMS Your last dose was: Last dose given on 2/13 at 9:15 a.m. Take 1 Tab by mouth two (2) times a day. 1 Tab  
    
   
   
   
  
 famotidine 20 mg tablet Commonly known as:  PEPCID Your last dose was: Last dose given 2/12 at 9:30 p.m. Take 20 mg by mouth nightly. 20 mg  
    
   
   
   
  
 FOSAMAX 70 mg tablet Generic drug:  alendronate Your last dose was:  Not given in the hospital.  
   
 Take 70 mg by mouth every Wednesday. 70 mg  
    
   
   
   
  
 ketoconazole 2 % shampoo Commonly known as:  NIZORAL Your last dose was:  Not given in the hospital.  
   
 Apply  to affected area two (2) days a week. On Monday and Thursday LEVEMIR 100 unit/mL injection Generic drug:  insulin detemir Your last dose was:  Not given in the hospital.  
   
 20 Units by SubCUTAneous route daily. 20 Units  
    
   
   
   
  
 loratadine 10 mg tablet Commonly known as:  Gautam Or Your last dose was: Last dose given on 2/13 at 9:15 a.m. Take 10 mg by mouth daily. 10 mg MIRALAX 17 gram/dose powder Generic drug:  polyethylene glycol Your last dose was:  Not given in the hospital.  
   
 Take 17 g by mouth nightly. Mix in 8 oz of water or juice. Hold if loose stools develop 17 g NORCO 5-325 mg per tablet Generic drug:  HYDROcodone-acetaminophen Your last dose was:  Not given in the hospital.  
   
 Take 1 Tab by mouth two (2) times daily as needed for Pain. 1 Tab * NovoLOG 100 unit/mL injection Generic drug:  insulin aspart Your last dose was:  Given per sliding scale instructions. by SubCUTAneous route Before breakfast, lunch, and dinner. Sliding scale 160 to 199 give 2 units 200 to 249 give 3 units 250 to 299 give 5 units 300 to 349 give 7 units 350 to 399 give 9 units Greater than 400 call MD  
     
   
   
   
  
 * NovoLOG 100 unit/mL injection Generic drug:  insulin aspart Your last dose was:  Given per sliding scale instructions. by SubCUTAneous route nightly. Sliding scale 200 to 249 give 2 units 250 to 299 give 3 units 300 to 349 give 4 units 350 to 399 give 5 units Greater than 400 call MD  
     
   
   
   
  
 * NovoLOG 100 unit/mL injection Generic drug:  insulin aspart  
   
 4 Units by SubCUTAneous route Before breakfast, lunch, and dinner. 4 Units OTHER Your last dose was:  Not given in the hospital.  
   
 Cranberry 450 mg twice daily  
     
   
   
   
  
 phenol 0.5 % Spra Commonly known as:  Zoraida Gavel Your last dose was:  Not given in the hospital.  
   
 1 Spray by Mucous Membrane route four (4) times daily as needed. 1 [place] REFRESH P.M. 57.3-42.5 % Oint ophthalmic ointment Generic drug:  White Petrolatum-Mineral Oil Your last dose was: Last dose given 2/12 at 9:30 p.m. Administer  to both eyes nightly. RESTASIS 0.05 % ophthalmic emulsion Generic drug:  cycloSPORINE Your last dose was: Last dose given on 2/13 at 9:15 a.m. Administer 1 Drop to both eyes two (2) times a day. 1 Drop  
    
   
   
   
  
 therapeutic multivitamin tablet Commonly known as:  [facility] Your last dose was: Last dose given on 2/13 at 9:15 a.m. Take 1 Tab by mouth daily. 1 Tab  
    
   
   
   
  
 TOPROL XL 25 mg XL tablet Generic drug:  metoprolol succinate Your last dose was: Last dose given on 2/13 at 9:15 a.m. Take 12.5 mg by mouth daily. Hold for sbp less than or equal to 100  
 12.5 mg  
    
   
   
   
  
 triamcinolone acetonide 0.1 % topical cream  
Commonly known as:  KENALOG Your last dose was: Last dose given on 2/13 at 9:15 a.m. Apply  to affected area two (2) times a day. use thin layer  Under breast and to abdominal folds VITAMIN D3 1,000 unit Cap Generic drug:  cholecalciferol Your last dose was: Last dose given on 2/13 at 9:15 a.m. Take 1,000 Units by mouth daily. 1000 Units VOLTAREN 1 % Gel Generic drug:  diclofenac Your last dose was: Last dose given on 2/13 at 9:15 a.m. Apply 2 g to affected area three (3) times daily. Apply to bilateral shoulders 2 g ZEASORB AF 2 % topical powder Generic drug:  miconazole Your last dose was:  Not given in the hospital.  
   
 Apply  to affected area two (2) times a day. Apply over the top of nystatin and triamcinolone cream under breast and abdominal folds * Notice: This list has 3 medication(s) that are the same as other medications prescribed for you. Read the directions carefully, and ask your doctor or other care provider to review them with you. STOP taking these medications   
 aspirin delayed-release 325 mg tablet DITROPAN XL 10 mg CR tablet Generic drug:  oxybutynin chloride XL  
   
  
 nystatin topical cream  
Commonly known as:  MYCOSTATIN Replaced by:  nystatin powder TAMIFLU 30 mg capsule Generic drug:  oseltamivir VICTOZA 2-ESTEFANÍA SC Where to Get Your Medications Information on where to get these meds will be given to you by the nurse or doctor. ! Ask your nurse or doctor about these medications  
  cefdinir 300 mg capsule  
 nystatin powder

## 2018-02-12 LAB
ALBUMIN SERPL-MCNC: 2.2 G/DL (ref 3.5–5)
ALBUMIN/GLOB SERPL: 0.5 {RATIO} (ref 1.1–2.2)
ALP SERPL-CCNC: 140 U/L (ref 45–117)
ALT SERPL-CCNC: 24 U/L (ref 12–78)
ANION GAP SERPL CALC-SCNC: 5 MMOL/L (ref 5–15)
AST SERPL-CCNC: 36 U/L (ref 15–37)
ATRIAL RATE: 94 BPM
BASOPHILS # BLD: 0.1 K/UL (ref 0–0.1)
BASOPHILS NFR BLD: 1 % (ref 0–1)
BILIRUB SERPL-MCNC: 0.4 MG/DL (ref 0.2–1)
BUN SERPL-MCNC: 15 MG/DL (ref 6–20)
BUN/CREAT SERPL: 16 (ref 12–20)
CALCIUM SERPL-MCNC: 8 MG/DL (ref 8.5–10.1)
CALCULATED P AXIS, ECG09: 67 DEGREES
CALCULATED R AXIS, ECG10: 128 DEGREES
CALCULATED T AXIS, ECG11: 35 DEGREES
CHLORIDE SERPL-SCNC: 107 MMOL/L (ref 97–108)
CO2 SERPL-SCNC: 27 MMOL/L (ref 21–32)
CREAT SERPL-MCNC: 0.95 MG/DL (ref 0.55–1.02)
DIAGNOSIS, 93000: NORMAL
DIFFERENTIAL METHOD BLD: ABNORMAL
EOSINOPHIL # BLD: 0.3 K/UL (ref 0–0.4)
EOSINOPHIL NFR BLD: 6 % (ref 0–7)
ERYTHROCYTE [DISTWIDTH] IN BLOOD BY AUTOMATED COUNT: 14.3 % (ref 11.5–14.5)
EST. AVERAGE GLUCOSE BLD GHB EST-MCNC: 137 MG/DL
GLOBULIN SER CALC-MCNC: 4.6 G/DL (ref 2–4)
GLUCOSE BLD STRIP.AUTO-MCNC: 144 MG/DL (ref 65–100)
GLUCOSE BLD STRIP.AUTO-MCNC: 200 MG/DL (ref 65–100)
GLUCOSE BLD STRIP.AUTO-MCNC: 242 MG/DL (ref 65–100)
GLUCOSE BLD STRIP.AUTO-MCNC: 269 MG/DL (ref 65–100)
GLUCOSE SERPL-MCNC: 158 MG/DL (ref 65–100)
HBA1C MFR BLD: 6.4 % (ref 4.2–6.3)
HCT VFR BLD AUTO: 35.7 % (ref 35–47)
HGB BLD-MCNC: 11.2 G/DL (ref 11.5–16)
IMM GRANULOCYTES # BLD: 0 K/UL (ref 0–0.04)
IMM GRANULOCYTES NFR BLD AUTO: 0 % (ref 0–0.5)
LYMPHOCYTES # BLD: 1.4 K/UL (ref 0.8–3.5)
LYMPHOCYTES NFR BLD: 27 % (ref 12–49)
MAGNESIUM SERPL-MCNC: 1.7 MG/DL (ref 1.6–2.4)
MCH RBC QN AUTO: 30.4 PG (ref 26–34)
MCHC RBC AUTO-ENTMCNC: 31.4 G/DL (ref 30–36.5)
MCV RBC AUTO: 96.7 FL (ref 80–99)
MONOCYTES # BLD: 0.6 K/UL (ref 0–1)
MONOCYTES NFR BLD: 12 % (ref 5–13)
NEUTS SEG # BLD: 2.8 K/UL (ref 1.8–8)
NEUTS SEG NFR BLD: 53 % (ref 32–75)
NRBC # BLD: 0 K/UL (ref 0–0.01)
NRBC BLD-RTO: 0 PER 100 WBC
P-R INTERVAL, ECG05: 214 MS
PLATELET # BLD AUTO: 146 K/UL (ref 150–400)
PMV BLD AUTO: 10.4 FL (ref 8.9–12.9)
POTASSIUM SERPL-SCNC: 3.9 MMOL/L (ref 3.5–5.1)
PROT SERPL-MCNC: 6.8 G/DL (ref 6.4–8.2)
Q-T INTERVAL, ECG07: 380 MS
QRS DURATION, ECG06: 128 MS
QTC CALCULATION (BEZET), ECG08: 475 MS
RBC # BLD AUTO: 3.69 M/UL (ref 3.8–5.2)
SERVICE CMNT-IMP: ABNORMAL
SODIUM SERPL-SCNC: 139 MMOL/L (ref 136–145)
VENTRICULAR RATE, ECG03: 94 BPM
WBC # BLD AUTO: 5.2 K/UL (ref 3.6–11)

## 2018-02-12 PROCEDURE — 77030034850

## 2018-02-12 PROCEDURE — 74011636637 HC RX REV CODE- 636/637: Performed by: INTERNAL MEDICINE

## 2018-02-12 PROCEDURE — 83036 HEMOGLOBIN GLYCOSYLATED A1C: CPT | Performed by: INTERNAL MEDICINE

## 2018-02-12 PROCEDURE — 74011000258 HC RX REV CODE- 258: Performed by: INTERNAL MEDICINE

## 2018-02-12 PROCEDURE — 82962 GLUCOSE BLOOD TEST: CPT

## 2018-02-12 PROCEDURE — 77030038269 HC DRN EXT URIN PURWCK BARD -A

## 2018-02-12 PROCEDURE — 77030033269 HC SLV COMPR SCD KNE2 CARD -B

## 2018-02-12 PROCEDURE — 83735 ASSAY OF MAGNESIUM: CPT | Performed by: INTERNAL MEDICINE

## 2018-02-12 PROCEDURE — 80053 COMPREHEN METABOLIC PANEL: CPT | Performed by: INTERNAL MEDICINE

## 2018-02-12 PROCEDURE — 77030034696 HC CATH URETH FOL 2W BARD -A

## 2018-02-12 PROCEDURE — 77030005546 HC CATH URETH FOL 3W BARD -A

## 2018-02-12 PROCEDURE — 74011250637 HC RX REV CODE- 250/637: Performed by: INTERNAL MEDICINE

## 2018-02-12 PROCEDURE — 36415 COLL VENOUS BLD VENIPUNCTURE: CPT | Performed by: INTERNAL MEDICINE

## 2018-02-12 PROCEDURE — 77030018846 HC SOL IRR STRL H20 ICUM -A

## 2018-02-12 PROCEDURE — 74011250636 HC RX REV CODE- 250/636: Performed by: INTERNAL MEDICINE

## 2018-02-12 PROCEDURE — 74011000250 HC RX REV CODE- 250: Performed by: INTERNAL MEDICINE

## 2018-02-12 PROCEDURE — 65660000000 HC RM CCU STEPDOWN

## 2018-02-12 PROCEDURE — 77010033678 HC OXYGEN DAILY

## 2018-02-12 PROCEDURE — 85025 COMPLETE CBC W/AUTO DIFF WBC: CPT | Performed by: INTERNAL MEDICINE

## 2018-02-12 RX ORDER — METOPROLOL SUCCINATE 25 MG/1
25 TABLET, EXTENDED RELEASE ORAL DAILY
Status: DISCONTINUED | OUTPATIENT
Start: 2018-02-13 | End: 2018-02-13 | Stop reason: HOSPADM

## 2018-02-12 RX ADMIN — NYSTATIN: 100000 POWDER TOPICAL at 21:35

## 2018-02-12 RX ADMIN — DICLOFENAC 2 G: 10 GEL TOPICAL at 11:11

## 2018-02-12 RX ADMIN — DICLOFENAC 2 G: 10 GEL TOPICAL at 21:35

## 2018-02-12 RX ADMIN — Medication 10 ML: at 21:35

## 2018-02-12 RX ADMIN — INSULIN LISPRO 2 UNITS: 100 INJECTION, SOLUTION INTRAVENOUS; SUBCUTANEOUS at 08:31

## 2018-02-12 RX ADMIN — INSULIN LISPRO 3 UNITS: 100 INJECTION, SOLUTION INTRAVENOUS; SUBCUTANEOUS at 12:16

## 2018-02-12 RX ADMIN — ANTACID TABLETS 200 MG: 500 TABLET, CHEWABLE ORAL at 17:07

## 2018-02-12 RX ADMIN — VITAMIN D, TAB 1000IU (100/BT) 1000 UNITS: 25 TAB at 08:30

## 2018-02-12 RX ADMIN — CYCLOSPORINE 1 DROP: 0.5 EMULSION OPHTHALMIC at 11:11

## 2018-02-12 RX ADMIN — NYSTATIN: 100000 POWDER TOPICAL at 08:31

## 2018-02-12 RX ADMIN — CYCLOSPORINE 1 DROP: 0.5 EMULSION OPHTHALMIC at 17:07

## 2018-02-12 RX ADMIN — OXYCODONE HYDROCHLORIDE AND ACETAMINOPHEN 500 MG: 500 TABLET ORAL at 08:30

## 2018-02-12 RX ADMIN — ANTACID TABLETS 200 MG: 500 TABLET, CHEWABLE ORAL at 08:30

## 2018-02-12 RX ADMIN — THERA TABS 1 TABLET: TAB at 08:31

## 2018-02-12 RX ADMIN — INSULIN LISPRO 2 UNITS: 100 INJECTION, SOLUTION INTRAVENOUS; SUBCUTANEOUS at 21:35

## 2018-02-12 RX ADMIN — METOPROLOL SUCCINATE 12.5 MG: 25 TABLET, EXTENDED RELEASE ORAL at 08:30

## 2018-02-12 RX ADMIN — OXYCODONE HYDROCHLORIDE AND ACETAMINOPHEN 500 MG: 500 TABLET ORAL at 17:07

## 2018-02-12 RX ADMIN — SODIUM CHLORIDE 1 G: 900 INJECTION, SOLUTION INTRAVENOUS at 08:30

## 2018-02-12 RX ADMIN — LORATADINE 10 MG: 10 TABLET ORAL at 08:30

## 2018-02-12 RX ADMIN — NYSTATIN: 100000 POWDER TOPICAL at 17:07

## 2018-02-12 RX ADMIN — DICLOFENAC 2 G: 10 GEL TOPICAL at 17:06

## 2018-02-12 RX ADMIN — Medication 10 ML: at 04:00

## 2018-02-12 RX ADMIN — Medication 10 ML: at 14:00

## 2018-02-12 RX ADMIN — INSULIN LISPRO 5 UNITS: 100 INJECTION, SOLUTION INTRAVENOUS; SUBCUTANEOUS at 17:07

## 2018-02-12 RX ADMIN — INSULIN GLARGINE 15 UNITS: 100 INJECTION, SOLUTION SUBCUTANEOUS at 08:31

## 2018-02-12 RX ADMIN — MINERAL OIL, PETROLATUM: 425; 573 OINTMENT OPHTHALMIC at 21:38

## 2018-02-12 RX ADMIN — FAMOTIDINE 20 MG: 20 TABLET, FILM COATED ORAL at 21:35

## 2018-02-12 NOTE — PROGRESS NOTES
Random bladder scan today 142cc    Voided jakob-aid urine in diaper/purewick without clots, PVR 83cc    Will continue to bladder scan and call if >150cc.

## 2018-02-12 NOTE — PROGRESS NOTES
Mickeal Blowers. Low UOP. No clots voided. AF VSS - intermittent tachycardia    22 fr 3-way placed. Drained jakob aide colored urine. Gently irrigated 15cc of small clots. She did not tolerate irrigation well but urine translucent.       -ok to dc sánchez if she cannot tolerate it  -while in irrigate as needed for clots

## 2018-02-12 NOTE — PROGRESS NOTES
Jasvir Joe [facility] 79  [address] [facility], [address] [place] [address]  [phone]      Medical Progress Note      NAME: Darryn Gómez   :  5/3/1930  MRM:  [id number]    Date/Time: 2018          Subjective:     Chief Complaint:  F/u hematuria    Chart/notes/labs/studies reviewed, patient examined at bedside. Feels better. No dysuria or abdominal pain. No fevers. Vance removed          Objective:       Vitals:          Last 24hrs VS reviewed since prior progress note. Most recent are:    Visit Vitals    /70 (BP 1 Location: Left arm, BP Patient Position: At rest)    Pulse 71    Temp 97.8 °F (36.6 °C)    Resp 15    Ht 5' 4\" (1.626 m)    Wt 98.8 kg (217 lb 12.8 oz)    SpO2 98%    BMI 37.39 kg/m2     SpO2 Readings from Last 6 Encounters:   18 98%   10/01/17 95%   17 98%   11/27/15 95%   09/27/15 95%   09/02/15 (!) 89%    O2 Flow Rate (L/min): 4 l/min     Intake/Output Summary (Last 24 hours) at 18 1552  Last data filed at 18 0800   Gross per 24 hour   Intake              200 ml   Output              450 ml   Net             -250 ml          Exam:     Physical Exam:    Gen: obese, elderly, chronically ill-appearing  HEENT:  No scleral icterus, hearing intact to voice, moist mucous membranes  Neck:  Supple, without masses  Resp:  No accessory muscle use. CTAB without wheezing, rales, rhonchi  Card: HR 70s, regular rhythm. Without murmurs, rubs, or gallops. No peripheral lower extremity edema. No JVD. Peripheral pulses in tact. Abd:  Normoactive bowel sounds. Soft, non-tender, non-distended. No rebound, no guarding. Musc:  No cyanosis or clubbing  Skin: redness under skin folds  Neuro:  Cranial nerves are grossly intact, no focal motor weakness, follows commands appropriately  Psych:  Limited insight, normal affect.  Alert, oriented to self and hospital. Answers questions appropriately        Medications Reviewed: (see below)    Lab Data Reviewed: (see below)    ______________________________________________________________________    Medications:     Current Facility-Administered Medications   Medication Dose Route Frequency    sodium chloride (NS) flush 5-10 mL  5-10 mL IntraVENous Q8H    sodium chloride (NS) flush 5-10 mL  5-10 mL IntraVENous PRN    0.9% sodium chloride infusion  75 mL/hr IntraVENous CONTINUOUS    sodium chloride (NS) flush 5-10 mL  5-10 mL IntraVENous Q8H    sodium chloride (NS) flush 5-10 mL  5-10 mL IntraVENous PRN    naloxone (NARCAN) injection 0.4 mg  0.4 mg IntraVENous PRN    insulin lispro (HUMALOG) injection   SubCUTAneous AC&HS    glucose chewable tablet 16 g  4 Tab Oral PRN    dextrose (D50W) injection syrg 12.5-25 g  12.5-25 g IntraVENous PRN    glucagon (GLUCAGEN) injection 1 mg  1 mg IntraMUSCular PRN    nystatin (MYCOSTATIN) 100,000 unit/gram powder   Topical TID    ascorbic acid (vitamin C) (VITAMIN C) tablet 500 mg  500 mg Oral BID    calcium carbonate (TUMS) chewable tablet 200 mg [elemental]  200 mg Oral BID    cholecalciferol (VITAMIN D3) tablet 1,000 Units  1,000 Units Oral DAILY    cycloSPORINE (RESTASIS) 0.05 % ophthalmic emulsion 1 Drop  1 Drop Both Eyes BID    diclofenac (VOLTAREN) 1 % topical gel 2 g  2 g Topical TID    famotidine (PEPCID) tablet 20 mg  20 mg Oral QHS    HYDROcodone-acetaminophen (NORCO) 5-325 mg per tablet 1 Tab  1 Tab Oral BID PRN    insulin glargine (LANTUS) injection 15 Units  15 Units SubCUTAneous DAILY    loratadine (CLARITIN) tablet 10 mg  10 mg Oral DAILY    therapeutic multivitamin (THERAGRAN) tablet 1 Tab  1 Tab Oral DAILY    polyethylene glycol (MIRALAX) packet 17 g  17 g Oral QHS    White Petrolatum-Mineral Oil (REFRESH P.M.) ophthalmic ointment   Both Eyes QHS    metoprolol succinate (TOPROL-XL) XL tablet 12.5 mg  12.5 mg Oral DAILY    cefTRIAXone (ROCEPHIN) 1 g in 0.9% sodium chloride 100 mL IVPB  1 g IntraVENous DAILY            Lab Review:     Recent Labs 02/12/18 0129 02/11/18   0757   WBC  5.2  7.8   HGB  11.2*  12.9   HCT  35.7  39.7   PLT  146*  183     Recent Labs      02/12/18 0129 02/11/18   0757   NA  139  138   K  3.9  4.2   CL  107  104   CO2  27  27   GLU  158*  172*   BUN  15  13   CREA  0.95  1.00   CA  8.0*  8.9   MG  1.7  1.7   ALB  2.2*  2.6*   SGOT  36  51*   ALT  24  30     No components found for: GLPOC  No results for input(s): PH, PCO2, PO2, HCO3, FIO2 in the last 72 hours. No results for input(s): INR in the last 72 hours. No lab exists for component: INREXT  Lab Results   Component Value Date/Time    Specimen Description: BLOOD 03/25/2011 05:55 AM    Specimen Description: URINE 05/10/2010 12:38 AM    Specimen Description: URINE 08/25/2009 02:00 PM     Lab Results   Component Value Date/Time    Culture result: GRAM NEGATIVE RODS (A) 02/11/2018 08:45 AM    Culture result: NORMAL RESPIRATORY SANTA/NO BETA STREP ISOLATED 09/30/2017 10:23 PM    Culture result: MRSA NOT PRESENT 11/24/2015 05:45 AM    Culture result:  11/24/2015 05:45 AM         Screening of patient nares for MRSA is for surveillance purposes and, if positive, to facilitate isolation considerations in high risk settings. It is not intended for automatic decolonization interventions per se as regimens are not sufficiently effective to warrant routine use. Assessment / Plan:     79 yo WF w/ hx of bladder cancer followed by Dr. Zaki Liu, paroxysmal afib NOT on anticoagulation, DM, HTN presenting with significant amount of hematuria, found to have UTI and afib with RVR       Hematuria: with pyuria and UTI. Hx of bladder cancer. CT showed bladder diverticula, irregular bladder wall thickening, and high density material layers in the base of the bladder possibly related to blood products. Cont IV CTX awaiting urine culture. Will need outpatient follow up with urology for cystoscopy (Dr. Zaki Liu). Possible discharge tomorrow       Paroxysmal afib: in and out of afib.  Rate controlled. No AC due to hematuria      Diabetes mellitus type 2 with neurological manifestations (formerly [facility]) () /   Diabetic sensorimotor neuropathy ([facility] Continue Lantus, SSI. A1c 6.4%, well controlled      DJD (degenerative joint disease) (3/27/2011). Diclofenac topical, home PO opioids PRN severe pain       Essential hypertension (3/27/2011): BP controlled off antihypertensives       GERD (gastroesophageal reflux disease) (3/27/2011). Cont H2B       Obstructive sleep apnea (3/27/2011): CPAP QHS      Depression with anxiety (3/27/2011): not on meds; monitor       Pressure ulcers / candidiasis: nystatin powder, frequent turns, wound care consult       Updated son via phone.      Total time spent with patient: 25 minutes                  Care Plan discussed with: Patient, Nursing Staff and >50% of time spent in counseling and coordination of care    Discussed:  Care Plan and D/C Planning    Prophylaxis:  SCD's    Disposition:  SNF/LTC           ___________________________________________________    Attending Physician: Blanka Quevedo MD

## 2018-02-12 NOTE — WOUND CARE
Wound care consult:  Initial visit for skin assessment, alert, no distress staff at bedside. Assessment  All skin folds and bony prominences assessed, turned with staff assistance. Incontinent of stool; sánchez in place. Skin folds under arms, breasts, lower pannus and inner thighs moist , red with yeast like rash- powder in place. Heels and elbows intact. Treatment  Incontinent care given with  Repositioned in bed   Heels floated    Recommendations/Plan  Nystatin powder ordered. Turn, reposition every 2 hours as tolerated, float heels  Incontinent care with comfort shields. Apply Zinc to all open areas, moisture barrier as needed. Plan of care reviewed with Dr Marie Momin. Will follow, reconsult as needed.     Douglas Pierce

## 2018-02-12 NOTE — PROGRESS NOTES
02/12/18     MSW met with the patient and son, Max Chamberlain, cell [phone] who is her POA. She is long term resident at [facility]. Son is paying $185.00/day to hold her bed there. She is basically wheelchair bound there but presently was getting therapy. Choice letter signed for [facility]. She will need an ambulance for transport at discharge. Referral sent through CC link. Will need to follow for discharge needs. Care Management Interventions  PCP Verified by CM:  Yes (Dr. Elicia Harman)  Transition of Care Consult (CM Consult): Discharge Planning, SNF (from [facility])  [address]: [address] Follow Up Transport: Family  Plan discussed with Pt/Family/Caregiver: Yes  Freedom of Choice Offered: Yes  Discharge Location  Discharge Placement: Skilled nursing facility     RADHA Vincent

## 2018-02-12 NOTE — PROGRESS NOTES
Problem: Falls - Risk of  Goal: *Absence of Falls  Document Ting Fall Risk and appropriate interventions in the flowsheet.    Outcome: Progressing Towards Goal  Fall Risk Interventions:       Mentation Interventions: Adequate sleep, hydration, pain control, Bed/chair exit alarm, Familiar objects from home    Medication Interventions: Bed/chair exit alarm, Patient to call before getting OOB, Teach patient to arise slowly    Elimination Interventions: Bed/chair exit alarm, Call light in reach, Toilet paper/wipes in reach, Toileting schedule/hourly rounds    History of Falls Interventions: Bed/chair exit alarm, Room close to nurse's station

## 2018-02-12 NOTE — PROGRESS NOTES
Bedside and Verbal shift change report given to Maddi Avendano RN (oncoming nurse) by Ita Mortensen RN (offgoing nurse). Report included the following information SBAR, Kardex, Intake/Output, MAR, Recent Results and Cardiac Rhythm A Fib/ SR.       0645  MD Niya Bernstein at bedside with patient. Assisted with placing [address] catheter. Urine output red with clots present when MD performed manual irrigation. Orders to keep Sánchez present if patient can tolerate and OK to remove if patient requests to do so. Patient at this time reporting discomfort and pressure however; states tolerable and OK to leave in at this time. 4140  Informed patient of new Urology orders with placing sánchez catheter. Patient at this time refusing. Patient states \"it will be painful and I have had it before. \" Explained to patient purpose for catheter placement in assisting with output. Patient continues to decline. Inform patient that urologist will be in shortly and further discussion can be made. 0600  Discussed with MD Niya Bernstein this morning that patient had a total output of 75mL of dark red urine; no clots present in urine or on purewick. Patient declines sensation of pressure or discomfort; only reports discomfort while performing bladder scan. Also informed MD that patient does have IVF at 75mL/hr running. Received orders from MD Niya Bernstein to place [address] sánchez catheter and to perform manual irrigation until urine is clear or without clots. 0355  Urine out 25mL via purewick. Post void bladder scan: 113mL. Urine output color a dark red. Bath performed, multiple sites of open skin POA. Patient repositioned, heels elevated, Nystatin powder and Zinc cream applied. Wound consult placed. 0003  Oxygen saturation decreasing to 85%. Informed patient and explained using CPAP as she does at home. Patient refusing CPAP, 4LPM via nasal cannula placed. Oxygen saturation at 99%. Will continue to monitor.  Notified monitor tech to notify RN is oxygen saturation decreases <90%. Patient in and out of A FIb; hx of paroxysmal noted. HR controlled with occasional events ranging from 100-114 then decreasing back >100 beats per minute. Patient in bed resting quietly. Will continue to monitor. [address] Niya Bernstein of post void bladder scanned. Also notified that urine is red, without clots, but small red spec particles appear present on pad. Orders to continue bladder scans this evening and to notify MD when bladder scan result : >150mL. 2200   Patient voided via purewick, 50 mL output, red. Post void bladder scan results : 83 mL. 2045   Received call back from MD Niya Bernstein, notified MD that bladder scan result: 142. Orders to await urine output and bladder scan then to call back with results. 2010  Received call from Urology, Informed MD Niya Bernstein that patient has yet voided since arriving to unit. Dr. Niya Bernstein with orders to bladder scan patient now and return call with results.

## 2018-02-13 VITALS
TEMPERATURE: 98 F | BODY MASS INDEX: 37.42 KG/M2 | WEIGHT: 219.2 LBS | HEIGHT: 64 IN | SYSTOLIC BLOOD PRESSURE: 132 MMHG | OXYGEN SATURATION: 98 % | RESPIRATION RATE: 22 BRPM | HEART RATE: 70 BPM | DIASTOLIC BLOOD PRESSURE: 61 MMHG

## 2018-02-13 LAB
ANION GAP SERPL CALC-SCNC: 7 MMOL/L (ref 5–15)
BACTERIA SPEC CULT: ABNORMAL
BUN SERPL-MCNC: 13 MG/DL (ref 6–20)
BUN/CREAT SERPL: 14 (ref 12–20)
CALCIUM SERPL-MCNC: 8.1 MG/DL (ref 8.5–10.1)
CC UR VC: ABNORMAL
CHLORIDE SERPL-SCNC: 107 MMOL/L (ref 97–108)
CO2 SERPL-SCNC: 25 MMOL/L (ref 21–32)
CREAT SERPL-MCNC: 0.93 MG/DL (ref 0.55–1.02)
ERYTHROCYTE [DISTWIDTH] IN BLOOD BY AUTOMATED COUNT: 14.2 % (ref 11.5–14.5)
GLUCOSE BLD STRIP.AUTO-MCNC: 148 MG/DL (ref 65–100)
GLUCOSE BLD STRIP.AUTO-MCNC: 183 MG/DL (ref 65–100)
GLUCOSE SERPL-MCNC: 162 MG/DL (ref 65–100)
HCT VFR BLD AUTO: 34.9 % (ref 35–47)
HGB BLD-MCNC: 11 G/DL (ref 11.5–16)
MAGNESIUM SERPL-MCNC: 1.7 MG/DL (ref 1.6–2.4)
MCH RBC QN AUTO: 30.1 PG (ref 26–34)
MCHC RBC AUTO-ENTMCNC: 31.5 G/DL (ref 30–36.5)
MCV RBC AUTO: 95.4 FL (ref 80–99)
NRBC # BLD: 0 K/UL (ref 0–0.01)
NRBC BLD-RTO: 0 PER 100 WBC
PLATELET # BLD AUTO: 135 K/UL (ref 150–400)
PMV BLD AUTO: 10.1 FL (ref 8.9–12.9)
POTASSIUM SERPL-SCNC: 3.9 MMOL/L (ref 3.5–5.1)
RBC # BLD AUTO: 3.66 M/UL (ref 3.8–5.2)
SERVICE CMNT-IMP: ABNORMAL
SODIUM SERPL-SCNC: 139 MMOL/L (ref 136–145)
WBC # BLD AUTO: 5.4 K/UL (ref 3.6–11)

## 2018-02-13 PROCEDURE — 36415 COLL VENOUS BLD VENIPUNCTURE: CPT | Performed by: INTERNAL MEDICINE

## 2018-02-13 PROCEDURE — 74011636637 HC RX REV CODE- 636/637: Performed by: INTERNAL MEDICINE

## 2018-02-13 PROCEDURE — 74011250637 HC RX REV CODE- 250/637: Performed by: INTERNAL MEDICINE

## 2018-02-13 PROCEDURE — 74011250636 HC RX REV CODE- 250/636: Performed by: INTERNAL MEDICINE

## 2018-02-13 PROCEDURE — 74011000258 HC RX REV CODE- 258: Performed by: INTERNAL MEDICINE

## 2018-02-13 PROCEDURE — 80048 BASIC METABOLIC PNL TOTAL CA: CPT | Performed by: INTERNAL MEDICINE

## 2018-02-13 PROCEDURE — 77030038269 HC DRN EXT URIN PURWCK BARD -A

## 2018-02-13 PROCEDURE — 77010033678 HC OXYGEN DAILY

## 2018-02-13 PROCEDURE — 74011000250 HC RX REV CODE- 250: Performed by: INTERNAL MEDICINE

## 2018-02-13 PROCEDURE — 85027 COMPLETE CBC AUTOMATED: CPT | Performed by: INTERNAL MEDICINE

## 2018-02-13 PROCEDURE — 82962 GLUCOSE BLOOD TEST: CPT

## 2018-02-13 PROCEDURE — 83735 ASSAY OF MAGNESIUM: CPT | Performed by: INTERNAL MEDICINE

## 2018-02-13 RX ORDER — CEFDINIR 300 MG/1
300 CAPSULE ORAL 2 TIMES DAILY
Qty: 14 CAP | Refills: 0 | Status: SHIPPED | OUTPATIENT
Start: 2018-02-13 | End: 2018-02-13

## 2018-02-13 RX ORDER — NYSTATIN 100000 [USP'U]/G
POWDER TOPICAL 3 TIMES DAILY
Qty: 1 BOTTLE | Refills: 0 | Status: SHIPPED | OUTPATIENT
Start: 2018-02-13

## 2018-02-13 RX ORDER — CEFDINIR 300 MG/1
300 CAPSULE ORAL 2 TIMES DAILY
Qty: 14 CAP | Refills: 0 | Status: SHIPPED | OUTPATIENT
Start: 2018-02-13 | End: 2018-02-20

## 2018-02-13 RX ADMIN — ANTACID TABLETS 200 MG: 500 TABLET, CHEWABLE ORAL at 09:15

## 2018-02-13 RX ADMIN — INSULIN LISPRO 2 UNITS: 100 INJECTION, SOLUTION INTRAVENOUS; SUBCUTANEOUS at 09:16

## 2018-02-13 RX ADMIN — OXYCODONE HYDROCHLORIDE AND ACETAMINOPHEN 500 MG: 500 TABLET ORAL at 09:15

## 2018-02-13 RX ADMIN — INSULIN GLARGINE 15 UNITS: 100 INJECTION, SOLUTION SUBCUTANEOUS at 09:16

## 2018-02-13 RX ADMIN — INSULIN LISPRO 2 UNITS: 100 INJECTION, SOLUTION INTRAVENOUS; SUBCUTANEOUS at 11:50

## 2018-02-13 RX ADMIN — SODIUM CHLORIDE 75 ML/HR: 900 INJECTION, SOLUTION INTRAVENOUS at 00:09

## 2018-02-13 RX ADMIN — METOPROLOL SUCCINATE 25 MG: 25 TABLET, EXTENDED RELEASE ORAL at 09:15

## 2018-02-13 RX ADMIN — NYSTATIN: 100000 POWDER TOPICAL at 09:15

## 2018-02-13 RX ADMIN — THERA TABS 1 TABLET: TAB at 09:15

## 2018-02-13 RX ADMIN — Medication 5 ML: at 06:00

## 2018-02-13 RX ADMIN — LORATADINE 10 MG: 10 TABLET ORAL at 09:15

## 2018-02-13 RX ADMIN — VITAMIN D, TAB 1000IU (100/BT) 1000 UNITS: 25 TAB at 09:15

## 2018-02-13 RX ADMIN — CYCLOSPORINE 1 DROP: 0.5 EMULSION OPHTHALMIC at 09:00

## 2018-02-13 RX ADMIN — SODIUM CHLORIDE 1 G: 900 INJECTION, SOLUTION INTRAVENOUS at 09:12

## 2018-02-13 RX ADMIN — DICLOFENAC 2 G: 10 GEL TOPICAL at 09:14

## 2018-02-13 NOTE — PROGRESS NOTES
Bedside and Verbal shift change report given to Gerri (oncoming nurse) by Ita Mortensen RN (offgoing nurse).  Report included the following information SBAR, Kardex, Intake/Output, MAR, Recent Results and Cardiac Rhythm A FIb.

## 2018-02-13 NOTE — DISCHARGE INSTRUCTIONS
HOSPITALIST DISCHARGE INSTRUCTIONS  NAME: Alexx Hopkins   :  5/3/1930   MRN:  [id number]     Date/Time:  2018 8:14 AM    ADMIT DATE: 2018     DISCHARGE DATE: 2018     PRINCIPAL DISCHARGE DIAGNOSES:  Hematuria (bloody urine)  Urinary tract infection    MEDICATIONS:  · It is important that you take the medication exactly as they are prescribed. Note the changes and additions to your medications. Be sure you understand these changes before you are discharged today. · Keep your medication in the bottles provided by the pharmacist and keep a list of the medication names, dosages, and times to be taken in your wallet. · Do not take other medications without consulting your doctor. Pain Management: per above medications    What to do at Home    Recommended diet:  Resume previous diet    Recommended activity: Activity as tolerated, with physical therapy    If you experience any of the following symptoms then please call your primary care physician or return to the emergency room if you cannot get hold of your doctor:  Fever, chills, severe abdominal pain, nausea, vomiting, diarrhea, worsening bleeding from urine, weakness, diziness, severe chest pain, shortness of breath, or other severe concerning symptoms that brought you to the hospital in the first place    Follow Up: Follow-up Information     Follow up With Details Comments [address] [facility] [address]  [phone]    follow up with primary care physician In 1 week      Sury Higgins MD In 3 days for bloody urine, with history of bladder cancer [address] [place] [address]  [phone]              Information obtained by :  I understand that if any problems occur once I am at home I am to contact my physician. I understand and acknowledge receipt of the instructions indicated above. Physician's or R.N.'s Signature                                                                  Date/Time                                                                                                                                              Patient or Representative Signature                                                          Date/Time

## 2018-02-13 NOTE — PROGRESS NOTES
I am waiting to hear back from [facility] ambulance regarding transport time. RN please call report to [facility] [phone] or [phone]. I spoke to Amira in admissions at [facility]. [facility] has been chosen as the selected destination in [address] [place] [address] has everything that they need for her admission.  Thanks RADHA Burciaga

## 2018-02-13 NOTE — PROGRESS NOTES
0715 - Bedside and Verbal shift change report given to Solitario Case (oncoming nurse) by Jose Huang RN (offgoing nurse). Report included the following information SBAR, Kardex, Intake/Output, Accordion, Recent Results and Cardiac Rhythm Afib. 1235 - Report called to [facility]. Report given to Ashleigh Núñez. All questions/concerns addressed at this time.

## 2018-02-13 NOTE — DISCHARGE SUMMARY
Physician Discharge Summary     Patient ID:  Alexx Hopkins  [id number]  88 y.o.  5/3/1930    Admit date: 2/11/2018    Discharge date: 2/13/2018    Admission Diagnoses: Hematuria    Principal Discharge Diagnoses:    Hematuria, UTI      OTHER PROBLEMS ADDRESSEDS  Principal Diagnosis <principal problem not specified>                                            Active Problems:    Obstructive sleep apnea (3/27/2011)      DJD (degenerative joint disease) (3/27/2011)      Essential hypertension (3/27/2011)      Diabetes mellitus type 2 with neurological manifestations ([address] (3/27/2011)      GERD (gastroesophageal reflux disease) (3/27/2011)      Depression with anxiety (3/27/2011)      History of bladder cancer (3/27/2011)      Diabetic sensorimotor neuropathy ([address] (3/30/2011)      UTI (urinary tract infection) (7/11/2015)      Hematuria (2/11/2018)       Patient Active Problem List   Diagnosis Code    Respiratory failure, acute ([address] J96.00    COPD with acute exacerbation ([address] J44.1    Pneumonia J18.9    Obstructive sleep apnea G47.33    DJD (degenerative joint disease) M19.90    Essential hypertension I10    Diabetes mellitus type 2 with neurological manifestations ([address] E11.49    GERD (gastroesophageal reflux disease) K21.9    Depression with anxiety F41.8    History of bladder cancer Z85.51    History of appendectomy Z90.49    Goiter, unspecified E04.9    Debility R53.81    Diabetic sensorimotor neuropathy ([address] E11.42    Sepsis ([address] A41.9    UTI (urinary tract infection) N39.0    Vomiting R11.10    ANGEL (acute kidney injury) ([address] N17.9    Decubitus ulcer of buttock, stage 2 L89.302    Narcolepsy G47.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 Eliecer Rivas is a 81 yo WF w/ hx of bladder cancer followed by Dr. Gin Deleon, paroxysmal afib NOT on anticoagulation, DM, HTN presenting with significant amount of hematuria, found to have UTI and afib with RVR        Hematuria: with pyuria and UTI. Hx of bladder cancer. CT showed bladder diverticula, irregular bladder wall thickening, and high density material layers in the base of the bladder possibly related to blood products. Was on IV CTX. Urine culture grew pan-sensitive E. coli. D/C home on [address] Will need outpatient follow up with urology for cystoscopy (Dr. Daria Huizar).       Paroxysmal afib: in and out of afib. Rate controlled. No AC due to hematuria       Diabetes mellitus type 2 with neurological manifestations ([facility]) () /   Diabetic sensorimotor neuropathy ([address] Continue insulin. Stopped Victoza. A1c 6.4%, well controlled       DJD (degenerative joint disease) (3/27/2011). Diclofenac topical, home PO opioids PRN severe pain        Essential hypertension (3/27/2011): BP controlled off antihypertensives        GERD (gastroesophageal reflux disease) (3/27/2011). Cont H2B        Obstructive sleep apnea (3/27/2011): CPAP QHS       Depression with anxiety (3/27/2011): not on meds; monitor        Pressure ulcers / candidiasis: nystatin powder, frequent turns, wound care consulted        Pt discharged in improved and stable condition. Procedures performed: see above    Imaging studies:   CT a/p  Bladder diverticula. Irregular bladder wall thickening. High density material  layers in the base of the bladder possibly related to blood products. Sigmoid  diverticulosis. Status post appendectomy and cholecystectomy. Mild free fluid.       PCP: Jude Walton, MD    Consults: Urology    Discharge Exam:  Patient Vitals for the past 12 hrs:   Temp Pulse Resp BP SpO2   02/13/18 1130 98 °F (36.7 °C) 70 22 132/61 98 %     . GEN: NAD  CV: RRR  RESP: CTAB      Disposition: SNF    Patient Instructions:   Current Discharge Medication List      START taking these medications    Details   nystatin (MYCOSTATIN) powder Apply  to affected area three (3) times daily.   Qty: 1 Bottle, Refills: 0      cefdinir (OMNICEF) 300 mg capsule Take 1 Cap by mouth two (2) times a day for 7 days. Qty: 14 Cap, Refills: 0         CONTINUE these medications which have NOT CHANGED    Details   famotidine (PEPCID) 20 mg tablet Take 20 mg by mouth nightly. alendronate (FOSAMAX) 70 mg tablet Take 70 mg by mouth every Wednesday. ketoconazole (NIZORAL) 2 % shampoo Apply  to affected area two (2) days a week. On Monday and Thursday      loratadine (CLARITIN) 10 mg tablet Take 10 mg by mouth daily. HYDROcodone-acetaminophen (NORCO) 5-325 mg per tablet Take 1 Tab by mouth two (2) times daily as needed for Pain.      triamcinolone acetonide (KENALOG) 0.1 % topical cream Apply  to affected area two (2) times a day. use thin layer   Under breast and to abdominal folds      cholecalciferol (VITAMIN D3) 1,000 unit cap Take 1,000 Units by mouth daily. diclofenac (VOLTAREN) 1 % gel Apply 2 g to affected area three (3) times daily. Apply to bilateral shoulders      miconazole (ZEASORB AF) 2 % topical powder Apply  to affected area two (2) times a day. Apply over the top of nystatin and triamcinolone cream under breast and abdominal folds      OTHER Cranberry 450 mg twice daily      !! insulin aspart (NOVOLOG) 100 unit/mL injection by SubCUTAneous route Before breakfast, lunch, and dinner. Sliding scale  160 to 199 give 2 units  200 to 249 give 3 units  250 to 299 give 5 units  300 to 349 give 7 units  350 to 399 give 9 units  Greater than 400 call MD      !! insulin aspart (NOVOLOG) 100 unit/mL injection by SubCUTAneous route nightly. Sliding scale  200 to 249 give 2 units  250 to 299 give 3 units  300 to 349 give 4 units  350 to 399 give 5 units  Greater than 400 call MD      !! insulin aspart (NOVOLOG) 100 unit/mL injection 4 Units by SubCUTAneous route Before breakfast, lunch, and dinner. White Petrolatum-Mineral Oil (REFRESH P.M.) 57.3-42.5 % oint ophthalmic ointment Administer  to both eyes nightly.       phenol (CHLORASEPTIC) 0.5 % spra 1 Spray by Mucous Membrane route four (4) times daily as needed. Qty: 177 mL, Refills: 0      insulin detemir (LEVEMIR) 100 unit/mL injection 20 Units by SubCUTAneous route daily. cycloSPORINE (RESTASIS) 0.05 % ophthalmic emulsion Administer 1 Drop to both eyes two (2) times a day. therapeutic multivitamin (THERAGRAN) tablet Take 1 Tab by mouth daily. metoprolol succinate (TOPROL XL) 25 mg XL tablet Take 12.5 mg by mouth daily. Hold for sbp less than or equal to 100      ascorbic acid (VITAMIN C) 500 mg tablet Take 1 Tab by mouth two (2) times a day. Qty: 60 Tab, Refills: 0      polyethylene glycol (MIRALAX) 17 gram/dose powder Take 17 g by mouth nightly. Mix in 8 oz of water or juice. Hold if loose stools develop      calcium carbonate (TUMS) 200 mg calcium (500 mg) chew Take 1 Tab by mouth two (2) times a day. !! - Potential duplicate medications found. Please discuss with provider. STOP taking these medications       LIRAGLUTIDE (VICTOZA 2-ESTEFANÍA SC) Comments:   Reason for Stopping:         oxybutynin chloride XL (DITROPAN XL) 10 mg CR tablet Comments:   Reason for Stopping:         nystatin (MYCOSTATIN) topical cream Comments:   Reason for Stopping:         oseltamivir (TAMIFLU) 30 mg capsule Comments:   Reason for Stopping:         aspirin delayed-release 325 mg tablet Comments:   Reason for Stopping:               Activity: See discharge instructions  Diet: See discharge instructions  Wound Care: See discharge instructions    Follow-up Information     Follow up With Details Comments [address] [address]  [phone]    follow up with primary care physician In 1 week      Joan Bello MD In 3 days for bloody urine, with history of bladder cancer [address] [place] [address]  [phone]            I spent 35 minutes on this discharge.     Signed:  Tiffani Staples MD  2/13/2018  8:16 AM

## 2018-02-13 NOTE — PROGRESS NOTES
Charge nurse chart review. 8292:  Prepping discharge paperwork. CM arranging for ambulance transport back to [facility]. Dr. June Herron has provided RX for cefdinir and nystatin. All education and care plans are completed.

## 2019-10-01 PROCEDURE — 88305 TISSUE EXAM BY PATHOLOGIST: CPT

## 2019-10-02 ENCOUNTER — HOSPITAL ENCOUNTER (OUTPATIENT)
Dept: LAB | Age: 84
Discharge: HOME | End: 2019-10-02

## 2020-01-07 ENCOUNTER — OFFICE VISIT (OUTPATIENT)
Dept: ONCOLOGY | Age: 85
End: 2020-01-07

## 2020-01-07 VITALS
SYSTOLIC BLOOD PRESSURE: 117 MMHG | BODY MASS INDEX: 37.63 KG/M2 | DIASTOLIC BLOOD PRESSURE: 70 MMHG | OXYGEN SATURATION: 90 % | TEMPERATURE: 97 F | RESPIRATION RATE: 20 BRPM | HEART RATE: 81 BPM | HEIGHT: 64 IN

## 2020-01-07 DIAGNOSIS — Z85.51 HISTORY OF BLADDER CANCER: Chronic | ICD-10-CM

## 2020-01-07 DIAGNOSIS — D69.6 THROMBOCYTOPENIA (HCC): Primary | ICD-10-CM

## 2020-01-07 DIAGNOSIS — E11.49 DIABETES MELLITUS TYPE 2 WITH NEUROLOGICAL MANIFESTATIONS (HCC): Chronic | ICD-10-CM

## 2020-01-07 DIAGNOSIS — Z11.59 ENCOUNTER FOR SCREENING FOR OTHER VIRAL DISEASES: ICD-10-CM

## 2020-01-07 DIAGNOSIS — D69.6 THROMBOCYTOPENIA (HCC): ICD-10-CM

## 2020-01-07 RX ORDER — ACETAMINOPHEN 325 MG/1
TABLET ORAL
COMMUNITY

## 2020-01-07 RX ORDER — FLUCONAZOLE 200 MG/1
200 TABLET ORAL DAILY
COMMUNITY

## 2020-01-07 RX ORDER — NALOXONE HYDROCHLORIDE 4 MG/.1ML
1 SPRAY NASAL
COMMUNITY

## 2020-01-07 RX ORDER — ACETAMINOPHEN AND CODEINE PHOSPHATE 300; 30 MG/1; MG/1
1 TABLET ORAL
COMMUNITY

## 2020-01-07 NOTE — PROGRESS NOTES
Soraida Carrillo is a 80 y.o. female here today for evaluation of thrombocytopenia. Resides at [facility] at [facility]. Will need paper script if any medications are prescribed.

## 2020-01-07 NOTE — PROGRESS NOTES
[address] [facility] at [facility]  [phone]    Hematology / Oncology Consult    Reason for Visit:   Danyell Tse is a 80 y.o. female who is seen in consultation at the request of Dr. Fili Villalobos for evaluation of thrombocytopenia. History of Present Illness:   Danyell Tse is a 80 y.o. female with HTN, DM, GERD, h/o bladder neoplasm comes in for evaluation of thrombocytopenia. Patient is accompanied by her son today who provides most of the history. For h/o bladder cancer, she has had superficial bladder cancers removed 2-3 times in the past. She is currently on active surveillance with cystoscopies annually and no problems in the past 6-7 yrs. For DM, she is on Insulin along with other medications. No h/o EtOH use or abuse. She used to smoke for > 50 yrs, but quit age [de-identified]. No recent infections or antibiotics. She has had vaginal yeast infections and is seeing a Dermatologist for various sores. On 8/7/19, pt had a growth in her tongue which would bleed intermittently. This was removed and has not caused any problems.      Labs 11/27/19: WBC 5, Hgb 12.7, MCV 96, PLT 32, alk phos 135, alb 2.6    Past Medical History:   Diagnosis Date    Anxiety     Bladder neoplasm     Cataracts, bilateral     Chronic pain     R shoulder arthritis    COPD, mild (HCC)     Coronary artery occlusion (HCC)     Dermatitis     Diabetes ([address]     DM (diabetes mellitus) ([address]     GERD (gastroesophageal reflux disease)     Hyperlipidemia     Hypertension     Narcolepsy     Narcolepsy     Neuropathy     Osteoarthritis     Osteoarthrosis     Other screening mammogram 08/31/2015    BIRADS 1:  Negative  repeat 1 year    Pain in joint, shoulder region     Pruritic disorder     Reflux esophagitis     Sepsis ([address]     Sleep apnea       Past Surgical History:   Procedure Laterality Date    HX APPENDECTOMY      HX CYSTECTOMY  1963    breast right    HX LAP CHOLECYSTECTOMY        Social History     Tobacco Use    Smoking status: Former Smoker    Smokeless tobacco: Never Used   Substance Use Topics    Alcohol use: No     Alcohol/week: 0.0 standard drinks      Family History   Problem Relation Age of Onset    Diabetes Mother     Hypertension Mother      Current Outpatient Medications   Medication Sig    acetaminophen (TYLENOL) 325 mg tablet Take  by mouth every four (4) hours as needed for Pain.  fluconazole (DIFLUCAN) 200 mg tablet Take 200 mg by mouth daily. Take one tab in morning every 14 days for psoriasis    acetaminophen-codeine (TYLENOL #3) 300-30 mg per tablet Take 1 Tab by mouth every eight (8) hours as needed for Pain.  alendronate (FOSAMAX) 70 mg tablet Take 70 mg by mouth every Wednesday.  insulin aspart (NOVOLOG) 100 unit/mL injection by SubCUTAneous route Before breakfast, lunch, and dinner. Sliding scale  160 to 199 give 2 units  200 to 249 give 3 units  250 to 299 give 5 units  300 to 349 give 7 units  350 to 399 give 9 units  Greater than 400 call MD    insulin aspart (NOVOLOG) 100 unit/mL injection by SubCUTAneous route nightly. Sliding scale  200 to 249 give 2 units  250 to 299 give 3 units  300 to 349 give 4 units  350 to 399 give 5 units  Greater than 400 call MD    insulin aspart (NOVOLOG) 100 unit/mL injection 4 Units by SubCUTAneous route Before breakfast, lunch, and dinner.  insulin detemir (LEVEMIR) 100 unit/mL injection 20 Units by SubCUTAneous route daily.  cycloSPORINE (RESTASIS) 0.05 % ophthalmic emulsion Administer 1 Drop to both eyes two (2) times a day.  metoprolol succinate (TOPROL XL) 25 mg XL tablet Take 12.5 mg by mouth daily. Hold for sbp less than or equal to 100    naloxone (NARCAN) 4 mg/actuation nasal spray 1 [place] by IntraNASal route once as needed. Use 1 spray intranasally, then discard. Repeat with new spray every 2 min as needed for opioid overdose symptoms, alternating nostrils.     nystatin (MYCOSTATIN) powder Apply  to affected area three (3) times daily.  famotidine (PEPCID) 20 mg tablet Take 20 mg by mouth nightly.  ketoconazole (NIZORAL) 2 % shampoo Apply  to affected area two (2) days a week. On Monday and Thursday    loratadine (CLARITIN) 10 mg tablet Take 10 mg by mouth daily.  HYDROcodone-acetaminophen (NORCO) 5-325 mg per tablet Take 1 Tab by mouth two (2) times daily as needed for Pain.  triamcinolone acetonide (KENALOG) 0.1 % topical cream Apply  to affected area two (2) times a day. use thin layer   Under breast and to abdominal folds    cholecalciferol (VITAMIN D3) 1,000 unit cap Take 1,000 Units by mouth daily.  diclofenac (VOLTAREN) 1 % gel Apply 2 g to affected area three (3) times daily. Apply to bilateral shoulders    miconazole (ZEASORB AF) 2 % topical powder Apply  to affected area two (2) times a day. Apply over the top of nystatin and triamcinolone cream under breast and abdominal folds    OTHER Cranberry 450 mg twice daily    White Petrolatum-Mineral Oil (REFRESH P.M.) 57.3-42.5 % oint ophthalmic ointment Administer  to both eyes nightly.  phenol (CHLORASEPTIC) 0.5 % spra 1 Spray by Mucous Membrane route four (4) times daily as needed.  therapeutic multivitamin (THERAGRAN) tablet Take 1 Tab by mouth daily.  ascorbic acid (VITAMIN C) 500 mg tablet Take 1 Tab by mouth two (2) times a day.  polyethylene glycol (MIRALAX) 17 gram/dose powder Take 17 g by mouth nightly. Mix in 8 oz of water or juice. Hold if loose stools develop    calcium carbonate (TUMS) 200 mg calcium (500 mg) chew Take 1 Tab by mouth two (2) times a day. No current facility-administered medications for this visit. Allergies   Allergen Reactions    Mercury (Bulk) Unknown (comments)        Review of Systems: A complete review of systems was obtained, negative except as described above.     Physical Exam:     Visit Vitals  /70   Pulse 81   Temp 97 °F (36.1 °C) (Oral)   Resp 20   Ht 5' 4\" (1.626 m) SpO2 90%   BMI 37.63 kg/m²     ECOG PS: 4  General: Well developed, no acute distress, in wheelchair, morbidly obese  Eyes: PERRLA, EOMI, anicteric sclerae  HENT: Atraumatic, OP clear, TMs intact without erythema  Neck: Supple  Lymphatic: No cervical, supraclavicular, axillary or inguinal adenopathy  Respiratory: CTAB, normal respiratory effort  CV: Normal rate, regular rhythm, no murmurs, no peripheral edema  GI: Soft, nontender, nondistended, no masses, no hepatomegaly, no splenomegaly  MS: Did not assess gait. Digits without clubbing or cyanosis. Skin: No rashes, ecchymoses, or petechiae. Normal temperature, turgor, and texture. Neuro/Psych: Alert, oriented. Hard of hearing. Deferred strength exam, but moves all 4 extremities. Decreased judgement/insight. Results:     Lab Results   Component Value Date/Time    WBC 5.4 02/13/2018 01:39 AM    HGB 11.0 (L) 02/13/2018 01:39 AM    HCT 34.9 (L) 02/13/2018 01:39 AM    PLATELET 800 (L) 94/42/2522 01:39 AM    MCV 95.4 02/13/2018 01:39 AM    ABS.  NEUTROPHILS 2.8 02/12/2018 01:29 AM    Hemoglobin (POC) 14.6 11/23/2015 07:37 PM    Hematocrit (POC) 43 11/23/2015 07:37 PM     Lab Results   Component Value Date/Time    Sodium 139 02/13/2018 01:39 AM    Potassium 3.9 02/13/2018 01:39 AM    Chloride 107 02/13/2018 01:39 AM    CO2 25 02/13/2018 01:39 AM    Glucose 162 (H) 02/13/2018 01:39 AM    BUN 13 02/13/2018 01:39 AM    Creatinine 0.93 02/13/2018 01:39 AM    GFR est AA >60 02/13/2018 01:39 AM    GFR est non-AA 57 (L) 02/13/2018 01:39 AM    Calcium 8.1 (L) 02/13/2018 01:39 AM    Sodium (POC) 142 11/23/2015 07:37 PM    Potassium (POC) 3.2 (L) 11/23/2015 07:37 PM    Chloride (POC) 110 (H) 11/23/2015 07:37 PM    Glucose (POC) 183 (H) 02/13/2018 11:33 AM    BUN (POC) 22 (H) 11/23/2015 07:37 PM    Creatinine (POC) 1.1 11/23/2015 07:37 PM    Calcium, ionized (POC) 0.98 (L) 11/23/2015 07:37 PM     Lab Results   Component Value Date/Time    Bilirubin, total 0.4 02/12/2018 01:29 AM    ALT (SGPT) 24 2018 01:29 AM    AST (SGOT) 36 2018 01:29 AM    Alk. phosphatase 140 (H) 2018 01:29 AM    Protein, total 6.8 2018 01:29 AM    Albumin 2.2 (L) 2018 01:29 AM    Globulin 4.6 (H) 2018 01:29 AM     Lab Results   Component Value Date/Time    Iron 18 (L) 2015 04:50 AM    TIBC 206 (L) 2015 04:50 AM    Iron % saturation 9 (L) 2015 04:50 AM    Ferritin 101 2015 04:50 AM       Lab Results   Component Value Date/Time    Vitamin B12 316 2015 04:50 AM    Folate 30.7 (H) 2015 04:50 AM     Lab Results   Component Value Date/Time    TSH 0.57 2015 04:25 AM     No results found for: HAMAT, HAAB, HABT, HAAT, HBSAG, HBSB, HBSAT, HBABN, HBCM, [address], [address] [facility], [address] [facility] [employer] [facility], [facility], [address] [place] [address], [id number], 1950 [facility], [address] [place] [address], [id number], [id number], [address] [place] [address], [id number], HBCMLT, [id number], HCGAT      Imagin/11/18 CXR:   IMPRESSION: Mild interstitial edema. Bilateral severe glenohumeral Osteoarthritis. 18 abd/pelvis CT:  FINDINGS:   LUNG BASES: Clear. INCIDENTALLY IMAGED HEART AND MEDIASTINUM: Unremarkable. LIVER: The liver contour is somewhat nodular but no focal abnormality is  identified. GALLBLADDER: Surgically absent. SPLEEN: No mass. PANCREAS: Atrophic without focal abnormality. ADRENALS: Unremarkable. KIDNEYS/URETERS: No mass, calculus, or hydronephrosis. STOMACH: Unremarkable. SMALL BOWEL: No dilatation or wall thickening. COLON: Sigmoid diverticulosis is noted. APPENDIX: Surgically absent. PERITONEUM: Mild free fluid is noted. RETROPERITONEUM: No lymphadenopathy or aortic aneurysm. REPRODUCTIVE ORGANS: There is no pelvic mass or lymphadenopathy. URINARY BLADDER: Bladder diverticula are noted. Irregular wall thickening is  evident. Slightly high density material is seen at the base of the bladder which  may be related to blood products.   BONES: Degenerative changes are seen in the hip joints bilaterally and lumbar  spine. ADDITIONAL COMMENTS: N/A  IMPRESSION:  Bladder diverticula. Irregular bladder wall thickening. High density material  layers in the base of the bladder possibly related to blood products. Sigmoid  diverticulosis. Status post appendectomy and cholecystectomy. Mild free fluid. Assessment & Plan:   Dory Lai is a 80 y.o. female with type II DM comes in for evaluation of thrombocytopenia. 1. Thrombocytopenia: Severely decreased in 30 range and was 135 two yrs ago in 2/2018. No recent medication changes, infections. Liver appeared nodular on CT in 2/2018. Therefore, I suspect pt may have thrombocytopenia due to development of CARLTON cirrhosis. -- CBC with diff, peripheral smear, LDH, haptoglobin, retic, DIC panel, Hep B/C profiles, HIV, gammopathy panel  -- Abdomen ultrasound to rule out splenomegaly and cirrhosis  -- Return in 1 week for follow up    2. Nodular liver: May be due to CARLTON cirrhosis. Obtaining ultrasound. 3. Type II DM: On insulin aspart with meals. 4. H/o bladder cancer: s/p 2-3 TURBT procedures. No recurrence on cystoscopies for the past 5-6 yrs. I appreciate the opportunity to participate in Ms. Pam Ross's care.     Signed By: Galdino Talamantes MD     January 7, 2020

## 2020-01-17 NOTE — PROGRESS NOTES
[address]  Medical Oncology at [facility]  [phone]    Hematology / Oncology Established Visit    Reason for Visit:   Waleska Aranda is a 80 y.o. female who comes in for f/u of thrombocytopenia. Initially referred by Dr. Kisha Ragland. History of Present Illness:   Waleska Aranda is a 80 y.o. female with HTN, DM, GERD, h/o bladder neoplasm comes in for evaluation of thrombocytopenia. Patient is accompanied by her son today who provides most of the history. For h/o bladder cancer, she has had superficial bladder cancers removed 2-3 times in the past. She is currently on active surveillance with cystoscopies annually and no problems in the past 6-7 yrs. For DM, she is on Insulin along with other medications. No h/o EtOH use or abuse. She used to smoke for > 50 yrs, but quit age [de-identified]. No recent infections or antibiotics. She has had vaginal yeast infections and is seeing a Dermatologist for various sores. On 8/7/19, pt had a growth in her tongue which would bleed intermittently. This was removed and has not caused any problems. Labs 11/27/19: WBC 5, Hgb 12.7, MCV 96, PLT 32, alk phos 135, alb 2.6    Interval History: Patient comes in today for follow up of recent lab work for evaluation of thrombocytopenia.      Past Medical History:   Diagnosis Date    Anxiety     Bladder neoplasm     Cataracts, bilateral     Chronic pain     R shoulder arthritis    COPD, mild (HCC)     Coronary artery occlusion (HCC)     Dermatitis     Diabetes ([address]     DM (diabetes mellitus) (HCC)     GERD (gastroesophageal reflux disease)     Hyperlipidemia     Hypertension     Narcolepsy     Narcolepsy     Neuropathy     Osteoarthritis     Osteoarthrosis     Other screening mammogram 08/31/2015    BIRADS 1:  Negative  repeat 1 year    Pain in joint, shoulder region     Pruritic disorder     Reflux esophagitis     Sepsis ([address]     Sleep apnea       Past Surgical History:   Procedure Laterality Date  HX APPENDECTOMY      HX CYSTECTOMY  1963    breast right    HX LAP CHOLECYSTECTOMY        Social History     Tobacco Use    Smoking status: Former Smoker    Smokeless tobacco: Never Used   Substance Use Topics    Alcohol use: No     Alcohol/week: 0.0 standard drinks      Family History   Problem Relation Age of Onset    Diabetes Mother     Hypertension Mother      Current Outpatient Medications   Medication Sig    acetaminophen (TYLENOL) 325 mg tablet Take  by mouth every four (4) hours as needed for Pain.  fluconazole (DIFLUCAN) 200 mg tablet Take 200 mg by mouth daily. Take one tab in morning every 14 days for psoriasis    naloxone (NARCAN) 4 mg/actuation nasal spray 1 [place] by IntraNASal route once as needed. Use 1 spray intranasally, then discard. Repeat with new spray every 2 min as needed for opioid overdose symptoms, alternating nostrils.  acetaminophen-codeine (TYLENOL #3) 300-30 mg per tablet Take 1 Tab by mouth every eight (8) hours as needed for Pain.  nystatin (MYCOSTATIN) powder Apply  to affected area three (3) times daily.  famotidine (PEPCID) 20 mg tablet Take 20 mg by mouth nightly.  alendronate (FOSAMAX) 70 mg tablet Take 70 mg by mouth every Wednesday.  ketoconazole (NIZORAL) 2 % shampoo Apply  to affected area two (2) days a week. On Monday and Thursday    loratadine (CLARITIN) 10 mg tablet Take 10 mg by mouth daily.  HYDROcodone-acetaminophen (NORCO) 5-325 mg per tablet Take 1 Tab by mouth two (2) times daily as needed for Pain.  triamcinolone acetonide (KENALOG) 0.1 % topical cream Apply  to affected area two (2) times a day. use thin layer   Under breast and to abdominal folds    cholecalciferol (VITAMIN D3) 1,000 unit cap Take 1,000 Units by mouth daily.  diclofenac (VOLTAREN) 1 % gel Apply 2 g to affected area three (3) times daily.  Apply to bilateral shoulders    miconazole (ZEASORB AF) 2 % topical powder Apply  to affected area two (2) times a day. Apply over the top of nystatin and triamcinolone cream under breast and abdominal folds    OTHER Cranberry 450 mg twice daily    insulin aspart (NOVOLOG) 100 unit/mL injection by SubCUTAneous route Before breakfast, lunch, and dinner. Sliding scale  160 to 199 give 2 units  200 to 249 give 3 units  250 to 299 give 5 units  300 to 349 give 7 units  350 to 399 give 9 units  Greater than 400 call MD    insulin aspart (NOVOLOG) 100 unit/mL injection by SubCUTAneous route nightly. Sliding scale  200 to 249 give 2 units  250 to 299 give 3 units  300 to 349 give 4 units  350 to 399 give 5 units  Greater than 400 call MD    insulin aspart (NOVOLOG) 100 unit/mL injection 4 Units by SubCUTAneous route Before breakfast, lunch, and dinner.  White Petrolatum-Mineral Oil (REFRESH P.M.) 57.3-42.5 % oint ophthalmic ointment Administer  to both eyes nightly.  phenol (CHLORASEPTIC) 0.5 % spra 1 Spray by Mucous Membrane route four (4) times daily as needed.  insulin detemir (LEVEMIR) 100 unit/mL injection 20 Units by SubCUTAneous route daily.  cycloSPORINE (RESTASIS) 0.05 % ophthalmic emulsion Administer 1 Drop to both eyes two (2) times a day.  therapeutic multivitamin (THERAGRAN) tablet Take 1 Tab by mouth daily.  metoprolol succinate (TOPROL XL) 25 mg XL tablet Take 12.5 mg by mouth daily. Hold for sbp less than or equal to 100    ascorbic acid (VITAMIN C) 500 mg tablet Take 1 Tab by mouth two (2) times a day.  polyethylene glycol (MIRALAX) 17 gram/dose powder Take 17 g by mouth nightly. Mix in 8 oz of water or juice. Hold if loose stools develop    calcium carbonate (TUMS) 200 mg calcium (500 mg) chew Take 1 Tab by mouth two (2) times a day. No current facility-administered medications for this visit. Allergies   Allergen Reactions    Mercury (Bulk) Unknown (comments)        Review of Systems: A complete review of systems was obtained, negative except as described above.     Physical Exam: Visit Vitals  /67   Pulse 68   Temp 97.7 °F (36.5 °C) (Oral)   Resp 16   Ht 5' 4\" (1.626 m)   SpO2 92%   BMI 37.63 kg/m²     ECOG PS: 4  General: Well developed, no acute distress, in wheelchair, morbidly obese  Eyes: PERRLA, EOMI, anicteric sclerae  HENT: Atraumatic, OP clear, TMs intact without erythema  Neck: Supple  Lymphatic: No cervical, supraclavicular, axillary or inguinal adenopathy  Respiratory: CTAB, normal respiratory effort  CV: Normal rate, regular rhythm, no murmurs, no peripheral edema  GI: Soft, nontender, nondistended, no masses, no hepatomegaly, no splenomegaly  MS: Did not assess gait. Digits without clubbing or cyanosis. Skin: No rashes, ecchymoses, or petechiae. Normal temperature, turgor, and texture. Neuro/Psych: Alert, oriented. Hard of hearing. Deferred strength exam, but moves all 4 extremities. Decreased judgement/insight. Results:     Lab Results   Component Value Date/Time    WBC 5.4 02/13/2018 01:39 AM    HGB 11.0 (L) 02/13/2018 01:39 AM    HCT 34.9 (L) 02/13/2018 01:39 AM    PLATELET 328 (L) 33/34/6855 01:39 AM    MCV 95.4 02/13/2018 01:39 AM    ABS.  NEUTROPHILS 2.8 02/12/2018 01:29 AM    Hemoglobin (POC) 14.6 11/23/2015 07:37 PM    Hematocrit (POC) 43 11/23/2015 07:37 PM     Lab Results   Component Value Date/Time    Sodium 139 02/13/2018 01:39 AM    Potassium 3.9 02/13/2018 01:39 AM    Chloride 107 02/13/2018 01:39 AM    CO2 25 02/13/2018 01:39 AM    Glucose 162 (H) 02/13/2018 01:39 AM    BUN 13 02/13/2018 01:39 AM    Creatinine 0.93 02/13/2018 01:39 AM    GFR est AA >60 02/13/2018 01:39 AM    GFR est non-AA 57 (L) 02/13/2018 01:39 AM    Calcium 8.1 (L) 02/13/2018 01:39 AM    Sodium (POC) 142 11/23/2015 07:37 PM    Potassium (POC) 3.2 (L) 11/23/2015 07:37 PM    Chloride (POC) 110 (H) 11/23/2015 07:37 PM    Glucose (POC) 183 (H) 02/13/2018 11:33 AM    BUN (POC) 22 (H) 11/23/2015 07:37 PM    Creatinine (POC) 1.1 11/23/2015 07:37 PM    Calcium, ionized (POC) 0.98 (L) 2015 07:37 PM     Lab Results   Component Value Date/Time    Bilirubin, total 0.4 2018 01:29 AM    ALT (SGPT) 24 2018 01:29 AM    AST (SGOT) 36 2018 01:29 AM    Alk. phosphatase 140 (H) 2018 01:29 AM    Protein, total 6.8 2018 01:29 AM    Albumin 2.2 (L) 2018 01:29 AM    Globulin 4.6 (H) 2018 01:29 AM     Lab Results   Component Value Date/Time    Iron 18 (L) 2015 04:50 AM    TIBC 206 (L) 2015 04:50 AM    Iron % saturation 9 (L) 2015 04:50 AM    Ferritin 101 2015 04:50 AM       Lab Results   Component Value Date/Time    Vitamin B12 316 2015 04:50 AM    Folate 30.7 (H) 2015 04:50 AM     Lab Results   Component Value Date/Time    TSH 0.57 2015 04:25 AM     No results found for: HAMAT, HAAB, HABT, HAAT, HBSAG, HBSB, HBSAT, HBABN, HBCM, [address], [address] [facility], 14[address] [facility], 550 [facility], 14[address] [facility], [address] [facility], 1950 [facility] [id number] [place] [address], [address] [place] [address], [id number], [id number], [address] [place] [address], [id number], HBCMLT, [id number], HCGAT      Imagin/11/18 CXR:   IMPRESSION: Mild interstitial edema. Bilateral severe glenohumeral Osteoarthritis. 18 abd/pelvis CT:  FINDINGS:   LUNG BASES: Clear. INCIDENTALLY IMAGED HEART AND MEDIASTINUM: Unremarkable. LIVER: The liver contour is somewhat nodular but no focal abnormality is  identified. GALLBLADDER: Surgically absent. SPLEEN: No mass. PANCREAS: Atrophic without focal abnormality. ADRENALS: Unremarkable. KIDNEYS/URETERS: No mass, calculus, or hydronephrosis. STOMACH: Unremarkable. SMALL BOWEL: No dilatation or wall thickening. COLON: Sigmoid diverticulosis is noted. APPENDIX: Surgically absent. PERITONEUM: Mild free fluid is noted. RETROPERITONEUM: No lymphadenopathy or aortic aneurysm. REPRODUCTIVE ORGANS: There is no pelvic mass or lymphadenopathy. URINARY BLADDER: Bladder diverticula are noted. Irregular wall thickening is  evident.  Slightly high density material is seen at the base of the bladder which  may be related to blood products. BONES: Degenerative changes are seen in the hip joints bilaterally and lumbar  spine. ADDITIONAL COMMENTS: N/A  IMPRESSION:  Bladder diverticula. Irregular bladder wall thickening. High density material  layers in the base of the bladder possibly related to blood products. Sigmoid  diverticulosis. Status post appendectomy and cholecystectomy. Mild free fluid. 1/18/20 Abdomen ultrasound: FINDINGS:  LIVER: Cirrhotic, with coarsened echotexture, surface nodularity, and widening  of the fissures. Small volume of ascites. No focal hepatic mass shown. The liver  is incompletely imaged due to its high location in the abdomen (better seen on  prior CT. MAIN PORTAL VEIN: Patent and normal caliber. GALLBLADDER: Surgically absent. COMMON DUCT: 0.5 cm in diameter. The duct is normal caliber. PANCREAS: The visualized portions of the pancreas are normal.   SPLEEN: Grossly normal. Approximately 11.6 cm in length. RIGHT KIDNEY: 9.2 cm in length. Atrophic, suggesting chronic kidney disease. No  gross hydronephrosis or large shadowing calculus. LEFT KIDNEY: Obscured by bowel gas. INFERIOR VENA CAVA: Normal caliber in its visualized portions. IMPRESSION:   Cirrhosis and small volume of ascites. Assessment & Plan:   Satnam Hernandez is a 80 y.o. female with type II DM comes in for evaluation of thrombocytopenia. 1. Thrombocytopenia: Most likely 2/2 CARLTON cirrhosis. Severely decreased in 29-30 range and was 135 two yrs ago in 2/2018. No recent medication changes, infections. Liver appeared nodular on CT in 2/2018. Recent abdominal ultrasound 1/18/20 shows cirrhotic liver with small volume of ascites. VitB12, folate, LDH normal. HIV/HepB/C profiles negative. No further workup needed to investigate cause as this seems 2/2 cirrhosis. If PLT count drops to 10 range, patient would be at risk of spontaneous bleeding such as intracranial or GI bleeding.  However, PLT count may remain stable in 20-30 range for several months to years. -- f/u gammopathy panel which is pending  -- No further hematology workup or management needed. 2. Cirrhosis: May be due to CARLTON. Would refer to GI/Hepatology for further evaluation and advice on management. However, given age and poor performance status, patient's son declines referral - which is reasonable. 3. Type II DM: On insulin aspart with meals. 4. H/o bladder cancer: s/p 2-3 TURBT procedures. No recurrence on cystoscopies for the past 5-6 yrs. I appreciate the opportunity to participate in Ms. Kunal Ross's care.     Signed By: Phylliss Spurling, MD     January 20, 2020

## 2020-01-18 ENCOUNTER — HOSPITAL ENCOUNTER (OUTPATIENT)
Dept: ULTRASOUND IMAGING | Age: 85
Discharge: HOME | End: 2020-01-18
Attending: INTERNAL MEDICINE
Payer: MEDICARE

## 2020-01-18 DIAGNOSIS — D69.6 THROMBOCYTOPENIA (HCC): ICD-10-CM

## 2020-01-18 PROCEDURE — 76700 US EXAM ABDOM COMPLETE: CPT

## 2020-01-20 ENCOUNTER — OFFICE VISIT (OUTPATIENT)
Dept: ONCOLOGY | Age: 85
End: 2020-01-20

## 2020-01-20 VITALS
BODY MASS INDEX: 37.63 KG/M2 | OXYGEN SATURATION: 92 % | HEART RATE: 68 BPM | SYSTOLIC BLOOD PRESSURE: 110 MMHG | RESPIRATION RATE: 16 BRPM | TEMPERATURE: 97.7 F | DIASTOLIC BLOOD PRESSURE: 67 MMHG | HEIGHT: 64 IN

## 2020-01-20 DIAGNOSIS — D69.6 THROMBOCYTOPENIA (HCC): Primary | ICD-10-CM

## 2020-01-20 DIAGNOSIS — E11.49 DIABETES MELLITUS TYPE 2 WITH NEUROLOGICAL MANIFESTATIONS (HCC): ICD-10-CM

## 2020-01-20 DIAGNOSIS — K75.81 LIVER CIRRHOSIS SECONDARY TO NASH (HCC): ICD-10-CM

## 2020-01-20 DIAGNOSIS — K74.60 LIVER CIRRHOSIS SECONDARY TO NASH (HCC): ICD-10-CM

## 2020-01-20 NOTE — PROGRESS NOTES
Samy Robertson is a 80 y.o. female here today for follow up of thrombocytopenia. States no pain today.

## 2020-01-20 NOTE — LETTER
1/20/20 Patient: Cailin Islas YOB: 1930 Date of Visit: 1/20/2020 Jude Walton MD 
Patient Can Only Remember The Practice Name And Not The Physician 
VIA Dear Jenni Walton MD, Thank you for referring Ms. Sherren Peri to [facility] for evaluation. My notes for this consultation are attached. If you have questions, please do not hesitate to call me. I look forward to following your patient along with you. Sincerely, Vi Tao MD